# Patient Record
Sex: MALE | Race: WHITE | Employment: OTHER | URBAN - METROPOLITAN AREA
[De-identification: names, ages, dates, MRNs, and addresses within clinical notes are randomized per-mention and may not be internally consistent; named-entity substitution may affect disease eponyms.]

---

## 2017-10-14 ENCOUNTER — HOSPITAL ENCOUNTER (OUTPATIENT)
Dept: MRI IMAGING | Age: 67
Discharge: HOME | End: 2017-10-14
Attending: ORTHOPAEDIC SURGERY
Payer: MEDICARE

## 2017-10-14 DIAGNOSIS — M25.562 LEFT KNEE PAIN: ICD-10-CM

## 2017-10-14 PROCEDURE — 73721 MRI JNT OF LWR EXTRE W/O DYE: CPT

## 2017-11-16 ENCOUNTER — ANESTHESIA EVENT (OUTPATIENT)
Dept: SURGERY | Age: 67
End: 2017-11-16
Payer: MEDICARE

## 2017-11-16 RX ORDER — ATORVASTATIN CALCIUM 20 MG/1
20 TABLET, FILM COATED ORAL DAILY
COMMUNITY
End: 2021-09-10

## 2017-11-16 RX ORDER — MELOXICAM 15 MG/1
15 TABLET ORAL DAILY
COMMUNITY
End: 2021-09-10

## 2017-11-16 RX ORDER — ALBUTEROL SULFATE 90 UG/1
1 AEROSOL, METERED RESPIRATORY (INHALATION)
COMMUNITY
End: 2019-04-08 | Stop reason: ALTCHOICE

## 2017-11-16 RX ORDER — DEXTROMETHORPHAN HYDROBROMIDE, GUAIFENESIN 5; 100 MG/5ML; MG/5ML
650 LIQUID ORAL EVERY 8 HOURS
COMMUNITY
End: 2019-04-08 | Stop reason: ALTCHOICE

## 2017-11-16 RX ORDER — ASPIRIN 81 MG/1
162 TABLET ORAL DAILY
COMMUNITY
End: 2021-09-10

## 2017-11-16 NOTE — PERIOP NOTES
[facility]  Ambulatory Surgery Unit  Pre-operative Instructions    Surgery/Procedure Date  11/17/17            Tentative Arrival Time 0386      1. On the day of your surgery/procedure, please report to the Ambulatory Surgery Unit Registration Desk and sign in at your designated time. The Ambulatory Surgery Unit is located in [facility] on the [facility] side of the [facility] [place] across from the [address] [place] [address]. Please have all of your health insurance cards and a photo ID. 2. You must have someone with you to drive you home, as you should not drive a car for 24 hours following anesthesia. Please make arrangements for a responsible adult friend or family member to stay with you for at least the first 24 hours after your surgery. 3. Do not have anything to eat or drink (including water, gum, mints, coffee, juice) after midnight   11/16/17. This may not apply to medications prescribed by your physician. (Please note below the special instructions with medications to take the morning of surgery, if applicable.)    4. We recommend you do not drink any alcoholic beverages for 24 hours before and after your surgery. 5. Contact your surgeons office for instructions on the following medications: non-steroidal anti-inflammatory drugs (i.e. Advil, Aleve), vitamins, and supplements. (Some surgeons will want you to stop these medications prior to surgery and others may allow you to take them)   **If you are currently taking Plavix, Coumadin, Aspirin and/or other blood-thinning agents, contact your surgeon for instructions. ** Your surgeon will partner with the physician prescribing these medications to determine if it is safe to stop or if you need to continue taking. Please do not stop taking these medications without instructions from your surgeon.     6. In an effort to help prevent surgical site infection, we ask that you shower with an anti-bacterial soap (i.e. Dial or Safeguard) for 3 days prior to and on the morning of surgery, using a fresh towel after each shower. (Please begin this process with fresh bed linens.) Do not apply any lotions, powders, or deodorants after the shower on the day of your procedure. If applicable, please do not shave the operative site for 48 hours prior to surgery. 7. Wear comfortable clothes. Wear glasses instead of contacts. Do not bring any jewelry or money (other than copays or fees as instructed). Do not wear make-up, particularly mascara, the morning of your surgery. Do not wear nail polish, particularly if you are having foot /hand surgery. Wear your hair loose or down, no ponytails, buns, stan pins or clips. All body piercings must be removed. 8. You should understand that if you do not follow these instructions your surgery may be cancelled. If your physical condition changes (i.e. fever, cold or flu) please contact your surgeon as soon as possible. 9. It is important that you be on time. If a situation occurs where you may be late, or if you have any questions or problems, please call [phone].    10. Your surgery time may be subject to change. You will receive a phone call the day prior to surgery to confirm your arrival time. 11. Pediatric patients: please bring a change of clothes, diapers, bottle/sippy cup, pacifier, etc.      Special Instructions: Take all medications and inhalers, as prescribed, on the morning of surgery with a sip of water EXCEPT: none      I understand a pre-operative phone call will be made to verify my surgery time. In the event that I am not available, I give permission for a message to be left on my answering service and/or with another person?       Yes     (instructions given verbally during phone assessment- pt voiced understanding)     ___________________      ___________________      ________________  (Signature of Patient)          (Witness)                   (Date and Time)

## 2017-11-17 ENCOUNTER — ANESTHESIA (OUTPATIENT)
Dept: SURGERY | Age: 67
End: 2017-11-17
Payer: MEDICARE

## 2017-11-17 ENCOUNTER — HOSPITAL ENCOUNTER (OUTPATIENT)
Age: 67
Setting detail: OUTPATIENT SURGERY
Discharge: HOME | End: 2017-11-17
Attending: ORTHOPAEDIC SURGERY | Admitting: ORTHOPAEDIC SURGERY
Payer: MEDICARE

## 2017-11-17 VITALS
WEIGHT: 292 LBS | RESPIRATION RATE: 16 BRPM | HEIGHT: 71 IN | HEART RATE: 88 BPM | DIASTOLIC BLOOD PRESSURE: 77 MMHG | BODY MASS INDEX: 40.88 KG/M2 | OXYGEN SATURATION: 95 % | TEMPERATURE: 97.8 F | SYSTOLIC BLOOD PRESSURE: 125 MMHG

## 2017-11-17 PROCEDURE — 77030011992 HC AIRWY NASOPHGL TELE -A: Performed by: NURSE ANESTHETIST, CERTIFIED REGISTERED

## 2017-11-17 PROCEDURE — 74011000250 HC RX REV CODE- 250

## 2017-11-17 PROCEDURE — 76060000061 HC AMB SURG ANES 0.5 TO 1 HR: Performed by: ORTHOPAEDIC SURGERY

## 2017-11-17 PROCEDURE — 76030000000 HC AMB SURG OR TIME 0.5 TO 1: Performed by: ORTHOPAEDIC SURGERY

## 2017-11-17 PROCEDURE — 77030006884 HC BLD SHV INCIS S&N -B: Performed by: ORTHOPAEDIC SURGERY

## 2017-11-17 PROCEDURE — 77030000032 HC CUF TRNQT ZIMM -B: Performed by: ORTHOPAEDIC SURGERY

## 2017-11-17 PROCEDURE — 76210000046 HC AMBSU PH II REC FIRST 0.5 HR: Performed by: ORTHOPAEDIC SURGERY

## 2017-11-17 PROCEDURE — 74011250636 HC RX REV CODE- 250/636: Performed by: ORTHOPAEDIC SURGERY

## 2017-11-17 PROCEDURE — 77030013079 HC BLNKT BAIR HGGR 3M -A: Performed by: NURSE ANESTHETIST, CERTIFIED REGISTERED

## 2017-11-17 PROCEDURE — 77030008495 HC TBNG ARTHSC IRR CNMD -B: Performed by: ORTHOPAEDIC SURGERY

## 2017-11-17 PROCEDURE — 77030008684 HC TU ET CUF COVD -B: Performed by: NURSE ANESTHETIST, CERTIFIED REGISTERED

## 2017-11-17 PROCEDURE — 74011250636 HC RX REV CODE- 250/636

## 2017-11-17 PROCEDURE — 77030018835 HC SOL IRR LR ICUM -A: Performed by: ORTHOPAEDIC SURGERY

## 2017-11-17 PROCEDURE — 77030002916 HC SUT ETHLN J&J -A: Performed by: ORTHOPAEDIC SURGERY

## 2017-11-17 PROCEDURE — 74011250636 HC RX REV CODE- 250/636: Performed by: ANESTHESIOLOGY

## 2017-11-17 PROCEDURE — 76210000035 HC AMBSU PH I REC 1 TO 1.5 HR: Performed by: ORTHOPAEDIC SURGERY

## 2017-11-17 PROCEDURE — 77030026438 HC STYL ET INTUB CARD -A: Performed by: NURSE ANESTHETIST, CERTIFIED REGISTERED

## 2017-11-17 PROCEDURE — 74011250637 HC RX REV CODE- 250/637: Performed by: ORTHOPAEDIC SURGERY

## 2017-11-17 PROCEDURE — 77030020255 HC SOL INJ LR 1000ML BG: Performed by: ORTHOPAEDIC SURGERY

## 2017-11-17 RX ORDER — HYDROCODONE BITARTRATE AND ACETAMINOPHEN 5; 325 MG/1; MG/1
1 TABLET ORAL AS NEEDED
Status: DISCONTINUED | OUTPATIENT
Start: 2017-11-17 | End: 2017-11-17 | Stop reason: HOSPADM

## 2017-11-17 RX ORDER — SUCCINYLCHOLINE CHLORIDE 20 MG/ML
INJECTION INTRAMUSCULAR; INTRAVENOUS AS NEEDED
Status: DISCONTINUED | OUTPATIENT
Start: 2017-11-17 | End: 2017-11-17 | Stop reason: HOSPADM

## 2017-11-17 RX ORDER — MIDAZOLAM HYDROCHLORIDE 1 MG/ML
INJECTION, SOLUTION INTRAMUSCULAR; INTRAVENOUS AS NEEDED
Status: DISCONTINUED | OUTPATIENT
Start: 2017-11-17 | End: 2017-11-17 | Stop reason: HOSPADM

## 2017-11-17 RX ORDER — FENTANYL CITRATE 50 UG/ML
INJECTION, SOLUTION INTRAMUSCULAR; INTRAVENOUS AS NEEDED
Status: DISCONTINUED | OUTPATIENT
Start: 2017-11-17 | End: 2017-11-17 | Stop reason: HOSPADM

## 2017-11-17 RX ORDER — SODIUM CHLORIDE 0.9 % (FLUSH) 0.9 %
5-10 SYRINGE (ML) INJECTION AS NEEDED
Status: DISCONTINUED | OUTPATIENT
Start: 2017-11-17 | End: 2017-11-17 | Stop reason: HOSPADM

## 2017-11-17 RX ORDER — SODIUM CHLORIDE 0.9 % (FLUSH) 0.9 %
5-10 SYRINGE (ML) INJECTION EVERY 8 HOURS
Status: DISCONTINUED | OUTPATIENT
Start: 2017-11-17 | End: 2017-11-17 | Stop reason: HOSPADM

## 2017-11-17 RX ORDER — LIDOCAINE HYDROCHLORIDE 10 MG/ML
0.1 INJECTION, SOLUTION EPIDURAL; INFILTRATION; INTRACAUDAL; PERINEURAL AS NEEDED
Status: DISCONTINUED | OUTPATIENT
Start: 2017-11-17 | End: 2017-11-17 | Stop reason: HOSPADM

## 2017-11-17 RX ORDER — PROPOFOL 10 MG/ML
INJECTION, EMULSION INTRAVENOUS AS NEEDED
Status: DISCONTINUED | OUTPATIENT
Start: 2017-11-17 | End: 2017-11-17 | Stop reason: HOSPADM

## 2017-11-17 RX ORDER — KETOROLAC TROMETHAMINE 30 MG/ML
INJECTION, SOLUTION INTRAMUSCULAR; INTRAVENOUS AS NEEDED
Status: DISCONTINUED | OUTPATIENT
Start: 2017-11-17 | End: 2017-11-17 | Stop reason: HOSPADM

## 2017-11-17 RX ORDER — LIDOCAINE HYDROCHLORIDE 20 MG/ML
INJECTION, SOLUTION EPIDURAL; INFILTRATION; INTRACAUDAL; PERINEURAL AS NEEDED
Status: DISCONTINUED | OUTPATIENT
Start: 2017-11-17 | End: 2017-11-17 | Stop reason: HOSPADM

## 2017-11-17 RX ORDER — ROPIVACAINE HYDROCHLORIDE 5 MG/ML
INJECTION, SOLUTION EPIDURAL; INFILTRATION; PERINEURAL AS NEEDED
Status: DISCONTINUED | OUTPATIENT
Start: 2017-11-17 | End: 2017-11-17 | Stop reason: HOSPADM

## 2017-11-17 RX ORDER — DEXAMETHASONE SODIUM PHOSPHATE 4 MG/ML
INJECTION, SOLUTION INTRA-ARTICULAR; INTRALESIONAL; INTRAMUSCULAR; INTRAVENOUS; SOFT TISSUE AS NEEDED
Status: DISCONTINUED | OUTPATIENT
Start: 2017-11-17 | End: 2017-11-17 | Stop reason: HOSPADM

## 2017-11-17 RX ORDER — SODIUM CHLORIDE, SODIUM LACTATE, POTASSIUM CHLORIDE, CALCIUM CHLORIDE 600; 310; 30; 20 MG/100ML; MG/100ML; MG/100ML; MG/100ML
25 INJECTION, SOLUTION INTRAVENOUS CONTINUOUS
Status: DISCONTINUED | OUTPATIENT
Start: 2017-11-17 | End: 2017-11-17 | Stop reason: HOSPADM

## 2017-11-17 RX ORDER — ONDANSETRON 2 MG/ML
INJECTION INTRAMUSCULAR; INTRAVENOUS AS NEEDED
Status: DISCONTINUED | OUTPATIENT
Start: 2017-11-17 | End: 2017-11-17 | Stop reason: HOSPADM

## 2017-11-17 RX ORDER — ROCURONIUM BROMIDE 10 MG/ML
INJECTION, SOLUTION INTRAVENOUS AS NEEDED
Status: DISCONTINUED | OUTPATIENT
Start: 2017-11-17 | End: 2017-11-17 | Stop reason: HOSPADM

## 2017-11-17 RX ORDER — MORPHINE SULFATE 10 MG/ML
2 INJECTION, SOLUTION INTRAMUSCULAR; INTRAVENOUS
Status: DISCONTINUED | OUTPATIENT
Start: 2017-11-17 | End: 2017-11-17 | Stop reason: HOSPADM

## 2017-11-17 RX ORDER — CEPHALEXIN 500 MG/1
500 CAPSULE ORAL 3 TIMES DAILY
Qty: 9 CAP | Refills: 0 | Status: SHIPPED | OUTPATIENT
Start: 2017-11-17 | End: 2017-11-20

## 2017-11-17 RX ORDER — DIPHENHYDRAMINE HYDROCHLORIDE 50 MG/ML
12.5 INJECTION, SOLUTION INTRAMUSCULAR; INTRAVENOUS AS NEEDED
Status: DISCONTINUED | OUTPATIENT
Start: 2017-11-17 | End: 2017-11-17 | Stop reason: HOSPADM

## 2017-11-17 RX ORDER — PHENYLEPHRINE HCL IN 0.9% NACL 0.4MG/10ML
SYRINGE (ML) INTRAVENOUS AS NEEDED
Status: DISCONTINUED | OUTPATIENT
Start: 2017-11-17 | End: 2017-11-17 | Stop reason: HOSPADM

## 2017-11-17 RX ORDER — ACETAMINOPHEN 10 MG/ML
INJECTION, SOLUTION INTRAVENOUS AS NEEDED
Status: DISCONTINUED | OUTPATIENT
Start: 2017-11-17 | End: 2017-11-17 | Stop reason: HOSPADM

## 2017-11-17 RX ORDER — HYDROCODONE BITARTRATE AND ACETAMINOPHEN 5; 325 MG/1; MG/1
1-2 TABLET ORAL
Qty: 40 TAB | Refills: 0 | Status: SHIPPED | OUTPATIENT
Start: 2017-11-17 | End: 2019-04-08 | Stop reason: ALTCHOICE

## 2017-11-17 RX ORDER — GLYCOPYRROLATE 0.2 MG/ML
INJECTION INTRAMUSCULAR; INTRAVENOUS AS NEEDED
Status: DISCONTINUED | OUTPATIENT
Start: 2017-11-17 | End: 2017-11-17 | Stop reason: HOSPADM

## 2017-11-17 RX ORDER — HYDROMORPHONE HYDROCHLORIDE 1 MG/ML
.2-.5 INJECTION, SOLUTION INTRAMUSCULAR; INTRAVENOUS; SUBCUTANEOUS ONCE
Status: DISCONTINUED | OUTPATIENT
Start: 2017-11-17 | End: 2017-11-17 | Stop reason: HOSPADM

## 2017-11-17 RX ORDER — FENTANYL CITRATE 50 UG/ML
25 INJECTION, SOLUTION INTRAMUSCULAR; INTRAVENOUS
Status: DISCONTINUED | OUTPATIENT
Start: 2017-11-17 | End: 2017-11-17 | Stop reason: HOSPADM

## 2017-11-17 RX ADMIN — RIVAROXABAN 10 MG: 10 TABLET, FILM COATED ORAL at 14:27

## 2017-11-17 RX ADMIN — ACETAMINOPHEN 1000 MG: 10 INJECTION, SOLUTION INTRAVENOUS at 13:11

## 2017-11-17 RX ADMIN — CEFAZOLIN 3 G: 1 INJECTION, POWDER, FOR SOLUTION INTRAMUSCULAR; INTRAVENOUS; PARENTERAL at 12:57

## 2017-11-17 RX ADMIN — SUCCINYLCHOLINE CHLORIDE 200 MG: 20 INJECTION INTRAMUSCULAR; INTRAVENOUS at 12:53

## 2017-11-17 RX ADMIN — DEXAMETHASONE SODIUM PHOSPHATE 8 MG: 4 INJECTION, SOLUTION INTRA-ARTICULAR; INTRALESIONAL; INTRAMUSCULAR; INTRAVENOUS; SOFT TISSUE at 13:00

## 2017-11-17 RX ADMIN — ROCURONIUM BROMIDE 5 MG: 10 INJECTION, SOLUTION INTRAVENOUS at 12:53

## 2017-11-17 RX ADMIN — KETOROLAC TROMETHAMINE 30 MG: 30 INJECTION, SOLUTION INTRAMUSCULAR; INTRAVENOUS at 13:26

## 2017-11-17 RX ADMIN — Medication 40 MCG: at 13:27

## 2017-11-17 RX ADMIN — ONDANSETRON 4 MG: 2 INJECTION INTRAMUSCULAR; INTRAVENOUS at 13:21

## 2017-11-17 RX ADMIN — FENTANYL CITRATE 25 MCG: 50 INJECTION, SOLUTION INTRAMUSCULAR; INTRAVENOUS at 13:11

## 2017-11-17 RX ADMIN — MIDAZOLAM HYDROCHLORIDE 1 MG: 1 INJECTION, SOLUTION INTRAMUSCULAR; INTRAVENOUS at 12:53

## 2017-11-17 RX ADMIN — LIDOCAINE HYDROCHLORIDE 100 MG: 20 INJECTION, SOLUTION EPIDURAL; INFILTRATION; INTRACAUDAL; PERINEURAL at 12:53

## 2017-11-17 RX ADMIN — FENTANYL CITRATE 25 MCG: 50 INJECTION, SOLUTION INTRAMUSCULAR; INTRAVENOUS at 12:53

## 2017-11-17 RX ADMIN — GLYCOPYRROLATE 0.1 MG: 0.2 INJECTION INTRAMUSCULAR; INTRAVENOUS at 13:25

## 2017-11-17 RX ADMIN — Medication 40 MCG: at 13:05

## 2017-11-17 RX ADMIN — SODIUM CHLORIDE, SODIUM LACTATE, POTASSIUM CHLORIDE, AND CALCIUM CHLORIDE 25 ML/HR: 600; 310; 30; 20 INJECTION, SOLUTION INTRAVENOUS at 11:54

## 2017-11-17 RX ADMIN — MIDAZOLAM HYDROCHLORIDE 1 MG: 1 INJECTION, SOLUTION INTRAMUSCULAR; INTRAVENOUS at 12:49

## 2017-11-17 RX ADMIN — FENTANYL CITRATE 25 MCG: 50 INJECTION, SOLUTION INTRAMUSCULAR; INTRAVENOUS at 13:01

## 2017-11-17 RX ADMIN — PROPOFOL 200 MG: 10 INJECTION, EMULSION INTRAVENOUS at 12:53

## 2017-11-17 NOTE — PERIOP NOTES
PACU~  1348-Pt received to pacu. Pt remains sedated, not arousable, has nasal trumphet in (L). On nasal cannula & open face tent at 100%. HOB elevated for easier breathing. Lt knee dressing dry, strong pedal pulses; elevated & applied ice.  1359- Pt arousing to stimuli. Pt able to cough, non-productive. 1407-Pt opening eyes briefly. 1409-Nasal airway removed. Pt arouses and denies complaints. Pt spont coughs, encouraged deep breathing. Also encouraging ankle pumps. Discharge instructions reviewed it conference room with wife. 1420-Pt waking more, face tent off. SaO2 92-94% on nasal cannula. Pt sipping soda. 1427-Xarelto admin per orders. Incentive spirometer used, pt has used before. Instructions given. Pt tolerated well, x5 upto 2940-7551. Incentive spirometer brought SaO2 up to 97-98%. After a few minutes SaO2 does go down to 88-90%. SaO2 does spont go back up without instruction or encouragement. 1435-Dr Karen Jeff at bedside. 1443-Pt awake and alert, eating crackers and drinking soda. Wife at bedside. SaO2 90-97% on room air. 1500-Pt cont to work on coughing and deep breathing. Pt is going for a sleep study next week. Dr Viri Bingham at bedside. Gave script for walker. Wife got scripts filled. 1505-Pt meets discharge criteria. He is more awake and denies complaints; pt maintaining SaO2 90-95%. His SaO2 does dip in to the 80s while he is eating or with activity. Instructed to eat slower & take deep breaths in between bites. Pt is taking his incentive spirometer home and was instructed to use every hour today while awake, wife also is instructed to encourage pt  to cough and deep breath every hour as well as perform ankle pumps. Pt states he is ready to go home & wife agrees. PIV removed & assisted pt with dressing. 1520-Pt discharged home in stable condition via w/c to car. Stand/pivot is steady is with minimal assist/support.

## 2017-11-17 NOTE — IP AVS SNAPSHOT
[address] [facility] 
[phone] Patient: Eleanor Abdalla MRN: [id number] UPQ:[id number]/[id number] About your hospitalization You were admitted on:  November 17, 2017 You last received care in the:  [facility] SURGERY UNIT You were discharged on:  November 17, 2017 Why you were hospitalized Your primary diagnosis was:  Not on File Things You Need To Do (next 8 weeks) Schedule an appointment with Maggie Mario MD as soon as possible for a visit in 1 week(s) Phone:  [phone] Where:  [facility] Discharge Orders None A check naty indicates which time of day the medication should be taken. My Medications TAKE these medications as instructed Instructions Each Dose to Equal  
 Morning Noon Evening Bedtime  
 albuterol 90 mcg/actuation inhaler Commonly known as:  PROVENTIL HFA, VENTOLIN HFA, PROAIR HFA Your last dose was: Your next dose is: Take 1 Puff by inhalation every four (4) hours as needed for Wheezing. 1 Puff  
    
   
   
   
  
 aspirin delayed-release 81 mg tablet Your last dose was: Your next dose is: Take 162 mg by mouth daily. 162 mg  
    
   
   
   
  
 cephALEXin 500 mg capsule Commonly known as:  Hilda Rummer Your last dose was: Your next dose is: Take 1 Cap by mouth three (3) times daily for 3 days. 500 mg HYDROcodone-acetaminophen 5-325 mg per tablet Commonly known as:  [address] Your last dose was: Your next dose is: Take 1-2 Tabs by mouth every four (4) hours as needed for Pain. Max Daily Amount: 12 Tabs. 1-2 Tab  
    
   
   
   
  
 LIPITOR 20 mg tablet Generic drug:  atorvastatin Your last dose was: Your next dose is: Take 20 mg by mouth daily.   
 20 mg  
    
   
   
   
  
 meloxicam 15 mg tablet Commonly known as:  MOBIC Your last dose was: Your next dose is: Take 15 mg by mouth daily. 15 mg PROTONIX 40 mg tablet Generic drug:  pantoprazole Your last dose was: Your next dose is: Take 40 mg by mouth daily. 40 mg  
    
   
   
   
  
 raNITIdine 300 mg tablet Commonly known as:  ZANTAC Your last dose was: Your next dose is: Take 300 mg by mouth daily. 300 mg  
    
   
   
   
  
 TYLENOL ARTHRITIS PAIN 650 mg Tber Generic drug:  acetaminophen Your last dose was: Your next dose is: Take 650 mg by mouth every eight (8) hours. 650 mg  
    
   
   
   
  
 VITAMIN D3 PO Your last dose was: Your next dose is: Take  by mouth daily. Where to Get Your Medications Information on where to get these meds will be given to you by the nurse or doctor. ! Ask your nurse or doctor about these medications  
  cephALEXin 500 mg capsule HYDROcodone-acetaminophen 5-325 mg per tablet Discharge Instructions [facility] 
POST OPERATIVE ARTHROSCOPY INSTRUCTIONS 1. Keep the operated extremity elevated above heart level as much as possible for at least 48 hours after surgery. 2. Keep a double wrapped bag of ice directly over the area of your surgery for 24 to 48 hours after surgery. Use a towel if necessary to prevent the ice bag from directly contacting your skin and alternate ice on and off the area every 30 minutes. You may notice a small amount of bloody drainage on the bandage which is normal. 
3. Do not allow your bandage to get wet. If it does, change it immediately replacing it with a clean, dry, sterile dressing. 4. Do not wrap ace bandages or other dressings too tight.  
5. One day following surgery you may remove the dressings and place band-aids over each wound afterwards. 6. Unless your doctor gives you instructions otherwise, you may put as much weight on your operated leg as is comfortable but minimize the amount of time that you are on your feet to minimize swelling. 7. Use the crutches as necessary to assist you in walking, but it is not necessary if you are comfortable without them. 8. Take Tylenol or prescription medicines as necessary to control your pain. Ice and elevation will help as well. 9. On the first day after surgery you should begin quadriceps strengthening exercises by maximally tightening the muscles in the front of your thigh and holding it to a count of ten, then relaxing. Repeat this 30 times, twice daily. You may also raise your leg a few inches off the ground to a count of 10 and repeat this exercise 30 times, twice daily. You cannot injure yourself by dong these simple exercises. 10. TAKE ONE ASPIRIN (NOT TYLENOL OR IBUPROFEN) DAILY FOR 4 WEEKS TO HELP PREVENT BLOOD CLOTS IN YOUR LEG. NOTIFY DR. WEBB IF FOR SOME REASON YOU CANNOT TAKE ASPIRIN. 11. You may begin showering 3 days after surgery and apply dry band-aids afterwards. 12. Make follow-up appointment for approximately 7 days after surgery to have sutures removed. Call [phone] and ask for Karla or Pily Horseman 
 
>>>You received an IV form of Tylenol 1000mg during your surgery, you may take tylenol (or pain medication containing Tylenol or Acetaminophen) in 6 hours at 7:00PM.<<< 
 
 
>>>You received Toradol during your surgery. You may not take any form of NSAIDS (non steroidal anti inflammatory drugs) such as Advil, Ibuprofen, Aleve, Motrin until 7:30PM.<<< 
 
 
DO NOT TAKE TYLENOL/ACETAMINOPHEN WITH  NORCO. TAKE NARCOTIC PAIN MEDICATIONS WITH FOOD Narcotics tend to be constipating, we suggest taking a stool softener such as Colace or Miralax (follow package instructions). DO NOT DRIVE WHILE TAKING NARCOTIC PAIN MEDICATIONS. DO NOT TAKE SLEEPING MEDICATIONS OR ANTIANXIETY MEDICATIONS WHILE TAKING NARCOTIC PAIN MEDICATIONS,  ESPECIALLY THE NIGHT OF ANESTHESIA. CPAP PATIENTS BE SURE TO WEAR MACHINE WHENEVER NAPPING OR SLEEPING. DISCHARGE SUMMARY from Nurse The following personal items collected during your admission are returned to you:  
Dental Appliance: Dental Appliances: With patient, Partials Vision: Visual Aid: Glasses (given to wife) Hearing Aid:   
Jewelry: Jewelry: None Clothing: Clothing: Other (comment) (belonging bag) Other Valuables: Other Valuables: None Valuables sent to safe:   
 
 
PATIENT INSTRUCTIONS: 
 
 
B - Balance E - Eyes F-  Face looks uneven A-  Arms unable to move or move even S-  Speech slurred or non-existent T-  Time-call 911 as soon as signs and symptoms begin-DO NOT go Back to bed or wait to see if you get better-TIME IS BRAIN. If you have not received your influenza and/or pneumococcal vaccine, please follow up with your primary care physician. The discharge information has been reviewed with the patient and caregiver. The patient and caregiver verbalized understanding. Introducing [facility]! [employer] introduces Linkovery patient portal. Now you can access parts of your medical record, email your doctor's office, and request medication refills online. 1. In your internet browser, go to [email or web address] Inct 2. Click on the First Time User? Click Here link in the Sign In box. You will see the New Member Sign Up page. 3. Enter your Linkovery Access Code exactly as it appears below. You will not need to use this code after youve completed the sign-up process. If you do not sign up before the expiration date, you must request a new code. · Linkovery Access Code: [email or web address] Expires: 1/3/2018  2:38 PM 
 
4. Enter the last four digits of your Social Security Number (xxxx) and Date of Birth (mm/dd/yyyy) as indicated and click Submit. You will be taken to the next sign-up page. 5. Create a Linkovery ID. This will be your Linkovery login ID and cannot be changed, so think of one that is secure and easy to remember. 6. Create a Linkovery password. You can change your password at any time. 7. Enter your Password Reset Question and Answer. This can be used at a later time if you forget your password. 8. Enter your e-mail address.  You will receive e-mail notification when new information is available in [employer]eview. 9. Click Sign Up. You can now view and download portions of your medical record. 10. Click the Download Summary menu link to download a portable copy of your medical information. If you have questions, please visit the Frequently Asked Questions section of the [employer]iew website. Remember, [employer] is NOT to be used for urgent needs. For medical emergencies, dial 911. Now available from your iPhone and Android! Providers Seen During Your Hospitalization Provider Specialty Primary office phone Jose Luis Renner MD Orthopedic Surgery [phone] Your Primary Care Physician (PCP) Primary Care Physician Office Phone Office Fax Arterry Sandra [phone] You are allergic to the following Allergen Reactions Crestor (Rosuvastatin) Shortness of Breath Percocet (Oxycodone-Acetaminophen) Rash Recent Documentation Height Weight BMI Smoking Status 1.791 m 132.5 kg 41.31 kg/m2 Former Smoker Emergency Contacts Name Discharge Info Relation Home Work Mobile [address] CAREGIVER [3] Spouse [3] [phone] Patient Belongings The following personal items are in your possession at time of discharge: 
  Dental Appliances: With patient, Partials  Visual Aid: Glasses (given to wife)      Home Medications: None   Jewelry: None  Clothing: Other (comment) (belonging bag)    Other Valuables: None Discharge Instructions Attachments/References MEFS - CEPHALEXIN (BIO-CEF, KEFLEX) - (BY MOUTH) (ENGLISH) MEFS - HYDROCODONE/ACETAMINOPHEN (VICODIN, Candice Oyster, LORTAB) - (BY MOUTH) (ENGLISH) Patient Handouts Cephalexin (Bio-Cef, Keflex) - (By mouth) Why this medicine is used:  
Treats infections. Contact a nurse or doctor right away if you have: · Blistering, peeling, or red skin rash · Severe or bloody diarrhea Common side effects: · Mild diarrhea or nausea © 2017 [address] Information is for End User's use only and may not be sold, redistributed or otherwise used for commercial purposes. Hydrocodone/Acetaminophen (Vicodin, Norco, Lortab) - (By mouth) Why this medicine is used:  
Treats pain. Contact a nurse or doctor right away if you have: · Blistering, peeling, red skin rash · Fast or slow heartbeat, shallow breathing, blue lips, fingernails, or skin · Anxiety, restlessness, muscle spasms, twitching, seeing or hearing things that are not there · Dark urine or pale stools, yellow skin or eyes · Extreme weakness, sweating, seizures, cold or clammy skin · Lightheadedness, dizziness, fainting, fever, sweating Common side effects: 
· Constipation, nausea, vomiting, loss of appetite, stomach pain · Tiredness or sleepiness © 2017 [address]  Information is for End User's use only and may not be sold, redistributed or otherwise used for commercial purposes. Please provide this summary of care documentation to your next provider. Signatures-by signing, you are acknowledging that this After Visit Summary has been reviewed with you and you have received a copy. Patient Signature:  ____________________________________________________________ Date:  ____________________________________________________________  
  
[facility] Provider Signature:  ____________________________________________________________ Date:  ____________________________________________________________

## 2017-11-17 NOTE — ANESTHESIA POSTPROCEDURE EVALUATION
Post-Anesthesia Evaluation and Assessment    Patient: Sadie Mccann MRN: [id number]  SSN: [id number]    YOB: 1950  Age: 69 y.o. Sex: male       Cardiovascular Function/Vital Signs  Visit Vitals    /77    Pulse 88    Temp 36.6 °C (97.8 °F)    Resp 16    Ht 5' 10.5\" (1.791 m)    Wt 132.5 kg (292 lb)    SpO2 95%    BMI 41.31 kg/m2       Patient is status post general anesthesia for Procedure(s):  ARTHROSCOPIC DEBRIDEMENT LEFT KNEE. Nausea/Vomiting: None    Postoperative hydration reviewed and adequate. Pain:  Pain Scale 1: Numeric (0 - 10) (11/17/17 1501)  Pain Intensity 1: 0 (11/17/17 1501)   Managed    Neurological Status:   Neuro (WDL): Within Defined Limits (11/17/17 1501)  Neuro  Neurologic State: Alert (11/17/17 1501)   At baseline    Mental Status and Level of Consciousness: Arousable    Pulmonary Status:   O2 Device: Room air (11/17/17 1501)   Adequate oxygenation and airway patent    Complications related to anesthesia: None    Post-anesthesia assessment completed. Pt initially having problems maintaining his O2 sats on room air; has sleep apnea, & is morbidly obese. Required coaching to deep breathe & cough. Using IS. To go for sleep study next week.     Signed By: Marita Arroyo MD     November 17, 2017

## 2017-11-17 NOTE — PERIOP NOTES
Marino Mcintosh  1950  [id number]    Situation:  Verbal report given from: VAMSHI Ng CRNA  Procedure: Procedure(s):  ARTHROSCOPIC DEBRIDEMENT LEFT KNEE    Background:    Preoperative diagnosis: LEFT KNEE MENISCUS TEAR    Postoperative diagnosis: LEFT KNEE MENISCUS TEAR    :  Dr. Tarik Donis    Assistant(s): Circ-1: Christel Lopez  Scrub Tech-1: Willian Varner    Specimens: * No specimens in log *    Assessment:  Intra-procedure medications         Anesthesia gave intra-procedure sedation and medications, see anesthesia flow sheet     Intravenous fluids: LR@ KVO     Vital signs stable   \"slow to wake\", difficult airway, NIKHIL & doesn't use CPAP      Recommendation:    Permission to share finding with family or friend yes

## 2017-11-17 NOTE — BRIEF OP NOTE
BRIEF OPERATIVE NOTE    Date of Procedure: 11/17/2017   Preoperative Diagnosis: LEFT KNEE MENISCUS TEAR  Postoperative Diagnosis: LEFT KNEE MENISCUS TEAR    Procedure(s):  ARTHROSCOPIC DEBRIDEMENT LEFT KNEE  Surgeon(s) and Role:     * Demario Sands MD - Primary         Assistant Staff:       Surgical Staff:  Circ-1: Kareen Simons  Scrub Tech-1: Angelica Varner  No case tracking events are documented in the log.   Anesthesia: General   Estimated Blood Loss: 0  Specimens: * No specimens in log *   Findings: 0   Complications: 0  Implants: * No implants in log *

## 2017-11-17 NOTE — ANESTHESIA PREPROCEDURE EVALUATION
Anesthetic History   No history of anesthetic complications            Review of Systems / Medical History  Patient summary reviewed, nursing notes reviewed and pertinent labs reviewed    Pulmonary        Sleep apnea: No treatment  Shortness of breath ( MULLIGAN; stable.  RA sats 95%)         Neuro/Psych   Within defined limits           Cardiovascular              Past MI (2014) and CAD (s/p stent 2014)      Comments: 08/17 Stress Test= EF 63%, normal   GI/Hepatic/Renal     GERD: well controlled           Endo/Other        Morbid obesity and cancer (prostate)     Other Findings            Physical Exam    Airway  Mallampati: III    Neck ROM: normal range of motion, short neck   Mouth opening: Normal    Comments: Large face Cardiovascular    Rhythm: regular  Rate: normal      Pertinent negatives: No murmur   Dental    Dentition: Upper partial plate     Pulmonary  Breath sounds clear to auscultation               Abdominal  GI exam deferred       Other Findings            Anesthetic Plan    ASA: 3  Anesthesia type: general    Monitoring Plan: BIS      Induction: Intravenous  Anesthetic plan and risks discussed with: Patient

## 2017-11-17 NOTE — DISCHARGE INSTRUCTIONS
[place] [facility]  POST OPERATIVE ARTHROSCOPY INSTRUCTIONS    1. Keep the operated extremity elevated above heart level as much as possible for at least 48 hours after surgery. 2. Keep a double wrapped bag of ice directly over the area of your surgery for 24 to 48 hours after surgery. Use a towel if necessary to prevent the ice bag from directly contacting your skin and alternate ice on and off the area every 30 minutes. You may notice a small amount of bloody drainage on the bandage which is normal.  3. Do not allow your bandage to get wet. If it does, change it immediately replacing it with a clean, dry, sterile dressing. 4. Do not wrap ace bandages or other dressings too tight. 5. One day following surgery you may remove the dressings and place band-aids over each wound afterwards. 6. Unless your doctor gives you instructions otherwise, you may put as much weight on your operated leg as is comfortable but minimize the amount of time that you are on your feet to minimize swelling. 7. Use the walker or cane as necessary to assist you in walking, but it is not necessary if you are comfortable without them. 8. Take Tylenol or prescription medicines as necessary to control your pain. Ice and elevation will help as well. 9. On the first day after surgery you should begin quadriceps strengthening exercises by maximally tightening the muscles in the front of your thigh and holding it to a count of ten, then relaxing. Repeat this 30 times, twice daily. You may also raise your leg a few inches off the ground to a count of 10 and repeat this exercise 30 times, twice daily. You cannot injure yourself by dong these simple exercises. 10. TAKE ONE ASPIRIN (NOT TYLENOL OR IBUPROFEN) DAILY FOR 4 WEEKS TO HELP PREVENT BLOOD CLOTS IN YOUR LEG. NOTIFY DR. WEBB IF FOR SOME REASON YOU CANNOT TAKE ASPIRIN. 11. You may begin showering 3 days after surgery and apply dry band-aids afterwards.   12. Make follow-up appointment for approximately 7 days after surgery to have sutures removed. Call [phone] and ask for Karla or Gera Slicker    >>>You received an IV form of Tylenol 1000mg during your surgery, you may take tylenol (or pain medication containing Tylenol or Acetaminophen) in 6 hours at 7:00PM.<<<      >>>You received Toradol during your surgery. You may not take any form of NSAIDS (non steroidal anti inflammatory drugs) such as Advil, Ibuprofen, Aleve, Motrin until 7:30PM.<<<      DO NOT TAKE TYLENOL/ACETAMINOPHEN WITH  NORCO. TAKE NARCOTIC PAIN MEDICATIONS WITH FOOD          Narcotics tend to be constipating, we suggest taking a stool softener such as Colace or Miralax (follow package instructions). DO NOT DRIVE WHILE TAKING NARCOTIC PAIN MEDICATIONS. DO NOT TAKE SLEEPING MEDICATIONS OR ANTIANXIETY MEDICATIONS WHILE TAKING NARCOTIC PAIN MEDICATIONS,  ESPECIALLY THE NIGHT OF ANESTHESIA. CPAP PATIENTS BE SURE TO WEAR MACHINE WHENEVER NAPPING OR SLEEPING. Perform incentive spirometer 10 times every hour while awake. Also cough deeply every hour while awake. DISCHARGE SUMMARY from Nurse    The following personal items collected during your admission are returned to you:   Dental Appliance: Dental Appliances: With patient, Partials returned to pt  Vision: Visual Aid: Glasses (given to wife)  Hearing Aid:    Jewelry: Jewelry: None  Clothing: Clothing: Other (comment) (belonging bag)  Other Valuables: Other Valuables: None  Valuables sent to safe:        PATIENT INSTRUCTIONS:    After General Anesthesia or Intravenous Sedation, for 24 hours or while taking prescription Narcotics:        Someone should be with you for the next 24 hours. For your own safety, a responsible adult must drive you home. · Limit your activities  · Recommended activity: Rest today, up with assistance today. Do not climb stairs or shower unattended for the next 24 hours.   · Please start with a soft bland diet and advance as tolerated (no nausea) to regular diet. · If you have a sore throat you should try the following: fluids, warm salt water gargles, or throat lozenges. If it does not improve after several days please follow up with your primary physician. · Do not drive and operate hazardous machinery  · Do not make important personal or business decisions  · Do  not drink alcoholic beverages  · If you have not urinated within 8 hours after discharge, please contact your surgeon on call. Report the following to your surgeon:  · Excessive pain, swelling, redness or odor of or around the surgical area  · Temperature over 100.5  · Nausea and vomiting lasting longer than 4 hours or if unable to take medications  · Any signs of decreased circulation or nerve impairment to extremity: change in color, persistent  numbness, tingling, coldness or increase pain      · You will receive a Post Operative Call from one of the Recovery Room Nurses on the day after your surgery to check on you. It is very important for us to know how you are recovering after your surgery. If you have an issue or need to speak with someone, please call your surgeon, do not wait for the post operative call. · You may receive an e-mail or letter in the mail from Jamila regarding your experience with us in the Ambulatory Surgery Unit. Your feedback is valuable to us and we appreciate your participation in the survey. · If the above instructions are not adequate, please contact Yoan Gonzalez RN, [facility] anesthesia Nurse Manager or our Anesthesiologist, at [phone]. If you are having problems after your surgery, call the physician at his office number. · We wish you a speedy recovery ? What to do at Home:      *  Please give a list of your current medications to your Primary Care Provider.     *  Please update this list whenever your medications are discontinued, doses are      changed, or new medications (including over-the-counter products) are added. *  Please carry medication information at all times in case of emergency situations. These are general instructions for a healthy lifestyle:    No smoking/ No tobacco products/ Avoid exposure to second hand smoke    Surgeon General's Warning:  Quitting smoking now greatly reduces serious risk to your health. Obesity, smoking, and sedentary lifestyle greatly increases your risk for illness    A healthy diet, regular physical exercise & weight monitoring are important for maintaining a healthy lifestyle    You may be retaining fluid if you have a history of heart failure or if you experience any of the following symptoms:  Weight gain of 3 pounds or more overnight or 5 pounds in a week, increased swelling in our hands or feet or shortness of breath while lying flat in bed. Please call your doctor as soon as you notice any of these symptoms; do not wait until your next office visit. Recognize signs and symptoms of STROKE:    B - Balance  E - Eyes    F-  Face looks uneven    A-  Arms unable to move or move even    S-  Speech slurred or non-existent    T-  Time-call 911 as soon as signs and symptoms begin-DO NOT go       Back to bed or wait to see if you get better-TIME IS BRAIN. If you have not received your influenza and/or pneumococcal vaccine, please follow up with your primary care physician. The discharge information has been reviewed with the patient and caregiver. The patient and caregiver verbalized understanding.

## 2017-11-18 NOTE — OP NOTES
[facility]    [place] [address]   OP NOTE       Name:  Beronica Pizarro   MR#:  [id number]   :  1950   Account #:  [de-identified]    Surgery Date:  2017   Date of Adm:  2017       PREOPERATIVE DIAGNOS IS:  Torn medial meniscus and   patellofemoral chondrosis, left knee. POSTOPERATIVE DIAGNOS IS:  Torn medial meniscus and   patellofemoral chondrosis, left knee. PROCEDURES PERFORMED:  Arthroscopic left medial meniscectomy   and patellofemoral chondroplasty. ESTIMATED BLOOD LOSS:  Minimal.     CLOSURE: Nylon. SPECIMENS REMOVED: **0     ANESTHESIA:  General.     SURGEON: Emily Hooker. Jose Carlos King MD.     CLOSURE: Nylon. DESCRIPTION OF PROCEDURE: The patient was given smooth   induction of general anesthesia in the supine position on the operating   table. The left lower extremity was prepped and draped. A thigh   tourniquet and thigh barahona, arthroscopic portals were established. Examination began in the suprapatellar pouch, which was normal. The   patellofemoral joint revealed grade 2 diffuse chondrosis on both the   trochlear and patellar surfaces, which was smoothed with a sucker   shaver. The medial gutter was normal. The medial compartment   revealed a calcified medial meniscus with complex tearing of the   posterior horn that was debrided with a sucker shaver and basket   forceps back to a smooth, stable rim. There was grade 2 chondrosis on   the medial femoral condyle. The notch was normal. The lateral   compartment revealed calcification of the meniscus but no tearing. The   knee joint was copiously irrigated and the arthroscopic instruments   removed. The portals were held closed with 3-0 nylon sutures and the   knee injected with ropivacaine. Sterile dressings were applied. The   patient was taken to the recovery room in stable, extubated condition   with a correct count and good pulse to his foot.         THALIA BOSTON MD KURTIS Black / Charlotte Caceres   D:  11/17/2017   13:34   T:  11/17/2017   21:43   Job #:  [id number]

## 2018-10-24 ENCOUNTER — OFFICE VISIT (OUTPATIENT)
Dept: FAMILY MEDICINE CLINIC | Age: 68
End: 2018-10-24

## 2018-10-24 VITALS
HEART RATE: 60 BPM | OXYGEN SATURATION: 95 % | BODY MASS INDEX: 40.02 KG/M2 | RESPIRATION RATE: 22 BRPM | TEMPERATURE: 97.7 F | WEIGHT: 285.9 LBS | HEIGHT: 71 IN | DIASTOLIC BLOOD PRESSURE: 76 MMHG | SYSTOLIC BLOOD PRESSURE: 137 MMHG

## 2018-10-24 DIAGNOSIS — M25.561 BILATERAL CHRONIC KNEE PAIN: ICD-10-CM

## 2018-10-24 DIAGNOSIS — G89.29 BILATERAL CHRONIC KNEE PAIN: ICD-10-CM

## 2018-10-24 DIAGNOSIS — E78.2 MIXED HYPERLIPIDEMIA: ICD-10-CM

## 2018-10-24 DIAGNOSIS — M25.562 BILATERAL CHRONIC KNEE PAIN: ICD-10-CM

## 2018-10-24 DIAGNOSIS — E66.9 OBESITY (BMI 35.0-39.9 WITHOUT COMORBIDITY): ICD-10-CM

## 2018-10-24 DIAGNOSIS — G47.33 OSA (OBSTRUCTIVE SLEEP APNEA): ICD-10-CM

## 2018-10-24 DIAGNOSIS — Z13.6 SCREENING FOR ISCHEMIC HEART DISEASE: Primary | ICD-10-CM

## 2018-10-24 DIAGNOSIS — Z13.1 SCREENING FOR DIABETES MELLITUS: ICD-10-CM

## 2018-10-24 PROBLEM — E66.01 SEVERE OBESITY (HCC): Status: ACTIVE | Noted: 2018-10-24

## 2018-10-24 NOTE — PROGRESS NOTES
Weight Loss Progress Note: Initial Physician Visit      Renee Barksdale is a 77 y.o. male with BMI   39.87  who is here for his Initial Evaluation for the medical bariatric care. CC: I want knee surgery    Weight History  Current weight *285 and BMI is Body mass index is 39.87 kg/m². Goal weight lose 30 lbs as first goal    Highest weight 285  (See weight gain time line scanned into media section of chart)    Weight loss History  How many weight loss attempts have you had? Which program were you most successful doing? Significant Medical History    Have you ever taken appetite suppressants?no no   If yes: Rx or OTC? If yes; Any negative side effects? Ever diagnosed with sleep apnea or put on CPAP yes    Ever had bariatric surgery: no    Pregnant or planning on becoming pregnant w/in 6 months: no    *    Significant Psychosocial History   Any history of drug abuse or dependence: no  Current Major Lifestyle Changes: no  Any potential unsupportive people: no      History of binge eating disorder or anorexia : no   If yes, are you currently being treated no    Social History  Social History     Tobacco Use    Smoking status: Former Smoker    Smokeless tobacco: Never Used   Substance Use Topics    Alcohol use: No     How many times a week do you eat out? 2    Do you drink any EtOH?  no   If so, how much? Do you have upcoming any travel in the next 6 weeks?  no   If so, what do you have planned?           Exercise  How many days a week do you currently exercise?  0 days  Have you ever been told by a physician not to exercise?  no      Objective  Visit Vitals  /76   Pulse 60   Temp 97.7 °F (36.5 °C) (Oral)   Resp 22   Ht 5' 11\" (1.803 m)   Wt 285 lb 14.4 oz (129.7 kg)   SpO2 95%   BMI 39.87 kg/m²       Waist Circumference: See Weight Management Doc Flowsheet  Neck Circumference: See Weight Management Doc Flowsheet  Percent Body Fat: See Weight Management Doc Flowsheet  Weight Metrics 10/24/2018 10/24/2018 11/17/2017 2/11/2013   Weight - 285 lb 14.4 oz 292 lb 283 lb   Neck Circ (inches) 20.5 - - -   Waist Measure Inches 54.5 - - -   BMI - 39.87 kg/m2 41.31 kg/m2 38.37 kg/m2         Labs:       Review of Systems  Complete ROS negative except where noted above      Physical Exam    Vital Signs Reviewed  Weight Management Metrics Reviewed    Vital Signs Reviewed  Appearance: Obese, A&O x 3, NAD  HEENT:  NC/AT, EOMI, PERRL, No scleral icterus, malampatti score:  Skin:    Skin tags - no   Acanthosis Nigricans - no  Neck:  No nodes, thyromegaly no  Heart:  RRR without M/R/G  Lungs:  CTAB, no rhonchi, rales, or wheezes with good air exchange   Abdomen:  Non-tender, pos bowel sounds; hepatomegaly - no  Ext:  No C/C/E        Assessment & Plan  Diagnoses and all orders for this visit:    1. Bilateral chronic knee pain  -     REFERRAL TO PHYSICAL THERAPY      2. Screening for ischemic heart disease  -     AMB POC EKG ROUTINE W/ 12 LEADS, INTER & REP        3. Obesity (BMI 35.0-39.9 without comorbidity)  -     METABOLIC PANEL, COMPREHENSIVE  Diet Plan: keep working to avoid carbs except fruit and veggies. Activity Plan: try senior exercise using silver sneakers    Medication Plan no changes  4. Mixed hyperlipidemia  -     LIPID PANEL    5. NIKHIL (obstructive sleep apnea)  Encouraged use of the cpap each day  6. Screening for diabetes mellitus  -     HEMOGLOBIN A1C WITH EAG        Based on his history and exam, Marge Arndt is a good candidate for the [facility]           There are no Patient Instructions on file for this visit. Follow-up Disposition:  Return in about 2 weeks (around 11/7/2018). Over 50% of the 30 minutes face to face time with Jean-Pierre Beckwith consisted of counseling & coordinating and/or discussing treatment plans in reference to his obesity The primary encounter diagnosis was Screening for ischemic heart disease.  Diagnoses of Bilateral chronic knee pain, Obesity (BMI 35.0-39.9 without comorbidity), Mixed hyperlipidemia, NIKHIL (obstructive sleep apnea), and Screening for diabetes mellitus were also pertinent to this visit.

## 2018-10-24 NOTE — PROGRESS NOTES
1. Have you been to the ER, urgent care clinic since your last visit? Hospitalized since your last visit? No    2. Have you seen or consulted any other health care providers outside of the [address] [place] [address] since your last visit? Include any pap smears or colon screening.  Merced Eden, PCP      Chief Complaint   Patient presents with   [address] Weight Management     Body Weight: 285.9  Body Fat%: 36.3  Muscle Mass Weight: 39.7  Body Water Weight: 97.8  Basal Metabolic Rate: 21709  BMI: 39.87

## 2018-10-25 LAB
ALBUMIN SERPL-MCNC: 4.3 G/DL (ref 3.5–4.8)
ALBUMIN/GLOB SERPL: 1.6 {RATIO} (ref 1.2–2.2)
ALP SERPL-CCNC: 113 IU/L (ref 39–117)
ALT SERPL-CCNC: 66 IU/L (ref 0–44)
AST SERPL-CCNC: 40 IU/L (ref 0–40)
BILIRUB SERPL-MCNC: 0.8 MG/DL (ref 0–1.2)
BUN SERPL-MCNC: 21 MG/DL (ref 8–27)
BUN/CREAT SERPL: 19 (ref 10–24)
CALCIUM SERPL-MCNC: 9.8 MG/DL (ref 8.6–10.2)
CHLORIDE SERPL-SCNC: 103 MMOL/L (ref 96–106)
CHOLEST SERPL-MCNC: 226 MG/DL (ref 100–199)
CO2 SERPL-SCNC: 26 MMOL/L (ref 20–29)
CREAT SERPL-MCNC: 1.13 MG/DL (ref 0.76–1.27)
EST. AVERAGE GLUCOSE BLD GHB EST-MCNC: 131 MG/DL
GLOBULIN SER CALC-MCNC: 2.7 G/DL (ref 1.5–4.5)
GLUCOSE SERPL-MCNC: 84 MG/DL (ref 65–99)
HBA1C MFR BLD: 6.2 % (ref 4.8–5.6)
HDLC SERPL-MCNC: 24 MG/DL
INTERPRETATION, 910389: NORMAL
LDLC SERPL CALC-MCNC: 139 MG/DL (ref 0–99)
POTASSIUM SERPL-SCNC: 4.5 MMOL/L (ref 3.5–5.2)
PROT SERPL-MCNC: 7 G/DL (ref 6–8.5)
SODIUM SERPL-SCNC: 143 MMOL/L (ref 134–144)
TRIGL SERPL-MCNC: 313 MG/DL (ref 0–149)
VLDLC SERPL CALC-MCNC: 63 MG/DL (ref 5–40)

## 2018-11-01 NOTE — PROGRESS NOTES
Spoke with pt spouse Jorge Worrell and provided with lab results/recommendations. Verbalized understanding and no further questions.

## 2018-11-01 NOTE — PROGRESS NOTES
The cholesterol is very high. Avoid fried food and junk food and fast food  This needs to be rechecked in 3 months  The liver test is slightly abnormal blood   The blood sugar is in the prediabetes zone.  The same diet changes I mentioned above will help correct the blood sugar too

## 2018-11-15 ENCOUNTER — OFFICE VISIT (OUTPATIENT)
Dept: FAMILY MEDICINE CLINIC | Age: 68
End: 2018-11-15

## 2018-11-15 VITALS
HEART RATE: 61 BPM | DIASTOLIC BLOOD PRESSURE: 72 MMHG | OXYGEN SATURATION: 97 % | HEIGHT: 72 IN | TEMPERATURE: 98.1 F | WEIGHT: 273.2 LBS | BODY MASS INDEX: 37 KG/M2 | RESPIRATION RATE: 22 BRPM | SYSTOLIC BLOOD PRESSURE: 144 MMHG

## 2018-11-15 DIAGNOSIS — E66.9 OBESITY (BMI 35.0-39.9 WITHOUT COMORBIDITY): ICD-10-CM

## 2018-11-15 DIAGNOSIS — G47.33 OSA (OBSTRUCTIVE SLEEP APNEA): ICD-10-CM

## 2018-11-15 DIAGNOSIS — E78.2 MIXED HYPERLIPIDEMIA: Primary | ICD-10-CM

## 2018-11-15 NOTE — PROGRESS NOTES
1. Have you been to the ER, urgent care clinic since your last visit? Hospitalized since your last visit? No    2. Have you seen or consulted any other health care providers outside of the [address] [place] [address] since your last visit? Include any pap smears or colon screening.  No     Chief Complaint   Patient presents with    Weight Management     Body Weight: 273.2  Body Fat%: 34.6  Muscle Mass Weight: 37.5  Body Water Weight: 58.1  Basal Metabolic Rate: 4843  BMI: 37.57

## 2018-11-15 NOTE — PROGRESS NOTES
Weight Loss Progress Note: follow up Physician Visit      Lita Perez is a 77 y.o. male with BMI , 37.57,  who is here for his follow up  Evaluation for the medical bariatric care. CC: I want to be healthier    Weight History  Current weight 273 and BMI is Body mass index is 37.57 kg/m². Goal weight -30 lbs  Highest weight  285  (See weight gain time line scanned into media section of chart)        Significant Medical History    Update on sleep apnea and  CPAP 8 hours    Ever had bariatric surgery: no    Pregnant or planning on becoming pregnant w/in 6 months: no         Significant Psychosocial History   Any history of drug abuse or dependence: no  Current Major Lifestyle Changes: no  Any potential unsupportive people: no          Social History  Social History     Tobacco Use    Smoking status: Former Smoker    Smokeless tobacco: Never Used   Substance Use Topics    Alcohol use: No     How many times a week do you eat out?  0    Do you drink any EtOH?  no   If so, how much? Do you have upcoming any travel in the next 6 weeks?  no   If so, what do you have planned? Exercise  How many days a week do you currently exercise?   3 days 45 mins  Have you ever been told by a physician not to exercise?  no      Objective  Visit Vitals  /72   Pulse 61   Temp 98.1 °F (36.7 °C) (Oral)   Resp 22   Ht 5' 11.5\" (1.816 m)   Wt 273 lb 3.2 oz (123.9 kg)   SpO2 97%   BMI 37.57 kg/m²       Bicep - (right ) circumference  Waist Circumference: See Weight Management Doc Flowsheet  Neck Circumference: See Weight Management Doc Flowsheet  Percent Body Fat: See Weight Management Doc Flowsheet  Weight Metrics 11/15/2018 11/15/2018 10/24/2018 10/24/2018 11/17/2017 2/11/2013   Weight - 273 lb 3.2 oz - 285 lb 14.4 oz 292 lb 283 lb   Neck Circ (inches) 19 - 20.5 - - -   Waist Measure Inches 54 - 54.5 - - -   BMI - 37.57 kg/m2 - 39.87 kg/m2 41.31 kg/m2 38.37 kg/m2         Labs:       Review of Systems  Complete ROS negative except where noted above      Physical Exam    Vital Signs Reviewed  Weight Management Metrics Reviewed    Vital Signs Reviewed  Appearance: Obese, A&O x 3, NAD  HEENT:  NC/AT, EOMI, PERRL, No scleral icterus, malampatti score:  Skin:    Skin tags - no   Acanthosis Nigricans - no  Neck:  No nodes, thyromegaly   Heart:  RRR without M/R/G  Lungs:  CTAB, no rhonchi, rales, or wheezes with good air exchange   Abdomen:  Non-tender, pos bowel sounds; hepatomegaly -   Ext:  No C/C/E        Assessment & Plan  Diagnoses and all orders for this visit:    1. Mixed hyperlipidemia  Cont to monitor  And treat  2. Obesity (BMI 35.0-39.9 without comorbidity)  Diet: on LCD, low carb. Doing well so far      Activity:encouraged senior exercise    Medication:no changes    3. NIKHIL (obstructive sleep apnea)    Use cpap dialy    Based on his history and exam, Sarath Marvin is a good candidate for the  [email or web address] Program         There are no Patient Instructions on file for this visit. Follow-up Disposition:  Return in about 3 weeks (around 12/6/2018). Over 50% of the 30 minutes face to face time with Franchesca & Kobe consisted of counseling & coordinating and/or discussing treatment plans in reference to his obesity The primary encounter diagnosis was Mixed hyperlipidemia. Diagnoses of Obesity (BMI 35.0-39.9 without comorbidity) and NIKHIL (obstructive sleep apnea) were also pertinent to this visit.   The patient verbalizes understanding and agrees with the plan of care    Patient has the advanced directives  booklet to review

## 2018-12-06 ENCOUNTER — OFFICE VISIT (OUTPATIENT)
Dept: FAMILY MEDICINE CLINIC | Age: 68
End: 2018-12-06

## 2018-12-06 VITALS
DIASTOLIC BLOOD PRESSURE: 75 MMHG | HEIGHT: 72 IN | WEIGHT: 269.6 LBS | SYSTOLIC BLOOD PRESSURE: 136 MMHG | TEMPERATURE: 98.2 F | OXYGEN SATURATION: 95 % | BODY MASS INDEX: 36.52 KG/M2 | HEART RATE: 73 BPM | RESPIRATION RATE: 20 BRPM

## 2018-12-06 DIAGNOSIS — E66.9 OBESITY (BMI 35.0-39.9 WITHOUT COMORBIDITY): ICD-10-CM

## 2018-12-06 DIAGNOSIS — E78.2 MIXED HYPERLIPIDEMIA: Primary | ICD-10-CM

## 2018-12-06 DIAGNOSIS — G47.33 OSA (OBSTRUCTIVE SLEEP APNEA): ICD-10-CM

## 2018-12-06 NOTE — PROGRESS NOTES
Weight Loss Progress Note: follow up Physician Visit      Ranjan Ventura is a 77 y.o. male with BMI , 37.08 who is here for his follow up  Evaluation for the medical bariatric care. CC: I want to be healthier    Weight History  Current weight 269    and BMI is Body mass index is 37.08 kg/m². Goal weight  -30  Highest weight 285  (See weight gain time line scanned into media section of chart)        Significant Medical History    Update on sleep apnea and  CPAP yes, avg 8 hours    Ever had bariatric surgery: no    Pregnant or planning on becoming pregnant w/in 6 months: no      Significant Psychosocial History   Any history of drug abuse or dependence: no  Current Major Lifestyle Changes: no  Any potential unsupportive people: no          Social History  Social History     Tobacco Use    Smoking status: Former Smoker    Smokeless tobacco: Never Used   Substance Use Topics    Alcohol use: No     How many times a week do you eat out? 4    Do you drink any EtOH?  no   If so, how much? Do you have upcoming any travel in the next 6 weeks?  no   If so, what do you have planned?           Exercise  How many days a week do you currently exercise?  2 days therapy  Have you ever been told by a physician not to exercise?  no      Objective  Visit Vitals  /75   Pulse 73   Temp 98.2 °F (36.8 °C) (Oral)   Resp 20   Ht 5' 11.5\" (1.816 m)   Wt 269 lb 9.6 oz (122.3 kg)   SpO2 95%   BMI 37.08 kg/m²       Bicep - (right ) circumference  Waist Circumference: See Weight Management Doc Flowsheet  Neck Circumference: See Weight Management Doc Flowsheet  Percent Body Fat: See Weight Management Doc Flowsheet  Weight Metrics 12/6/2018 12/6/2018 11/15/2018 11/15/2018 10/24/2018 10/24/2018 11/17/2017   Weight - 269 lb 9.6 oz - 273 lb 3.2 oz - 285 lb 14.4 oz 292 lb   Neck Circ (inches) 19 - 19 - 20.5 - -   Waist Measure Inches 52.5 - 54 - 54.5 - -   BMI - 37.08 kg/m2 - 37.57 kg/m2 - 39.87 kg/m2 41.31 kg/m2 Labs:       Review of Systems  Complete ROS negative except where noted above      Physical Exam    Vital Signs Reviewed  Weight Management Metrics Reviewed    Vital Signs Reviewed  Appearance: Obese, A&O x 3, NAD  HEENT:  NC/AT, EOMI, PERRL, No scleral icterus, malampatti score:  Skin:    Skin tags - no   Acanthosis Nigricans - no  Neck:  No nodes, thyromegaly   Heart:  RRR without M/R/G  Lungs:  CTAB, no rhonchi, rales, or wheezes with good air exchange   Abdomen:  Non-tender, pos bowel sounds; hepatomegaly -   Ext:  No C/C/E        Assessment & Plan  Diagnoses and all orders for this visit:    1. Mixed hyperlipidemia  Monitor and treat as indicated  2. Obesity (BMI 35.0-39.9 without comorbidity)  Diet   Has lost 16 pounds so far, doing well    Activity: encouraged exercise in pool or chair exercise at least 15-20 mins a day and cont to increase    Medication:no changes    3. NIKHIL (obstructive sleep apnea)  I explined the importance of treating NIKHIL for weight loss. Untreated the patient becomes resistant to weight loss      Based on his history and exam, Kirby Hartmann is a good candidate for the  [facility] Loss Program         There are no Patient Instructions on file for this visit. Follow-up Disposition: Not on File    Over 50% of the 30minutes face to face time with Chris Larios consisted of counseling & coordinating and/or discussing treatment plans in reference to his obesity The primary encounter diagnosis was Mixed hyperlipidemia. Diagnoses of Obesity (BMI 35.0-39.9 without comorbidity) and NIKHIL (obstructive sleep apnea) were also pertinent to this visit.   The patient verbalizes understanding and agrees with the plan of care    Patient has the advanced directives  booklet to review

## 2018-12-06 NOTE — PROGRESS NOTES
1. Have you been to the ER, urgent care clinic since your last visit? Hospitalized since your last visit? No    2. Have you seen or consulted any other health care providers outside of the [address] [place] [address] since your last visit? Include any pap smears or colon screening.  Dr. Natanael Cox, PCP      Chief Complaint   Patient presents with   [place] Weight [facility]     Body Weight: 269.6  Body Fat%: 34.2  Muscle Mass Weight: 37.0  Body Water Weight: 71.3  Basal Metabolic Rate: 6305  BMI: 37.08

## 2019-01-08 ENCOUNTER — OFFICE VISIT (OUTPATIENT)
Dept: FAMILY MEDICINE CLINIC | Age: 69
End: 2019-01-08

## 2019-01-08 VITALS
TEMPERATURE: 97.8 F | WEIGHT: 262.25 LBS | BODY MASS INDEX: 35.52 KG/M2 | HEART RATE: 68 BPM | OXYGEN SATURATION: 95 % | SYSTOLIC BLOOD PRESSURE: 132 MMHG | RESPIRATION RATE: 19 BRPM | DIASTOLIC BLOOD PRESSURE: 72 MMHG | HEIGHT: 72 IN

## 2019-01-08 DIAGNOSIS — G47.33 OSA (OBSTRUCTIVE SLEEP APNEA): ICD-10-CM

## 2019-01-08 DIAGNOSIS — E78.2 MIXED HYPERLIPIDEMIA: Primary | ICD-10-CM

## 2019-01-08 DIAGNOSIS — E66.9 OBESITY (BMI 35.0-39.9 WITHOUT COMORBIDITY): ICD-10-CM

## 2019-01-08 NOTE — PROGRESS NOTES
1. Have you been to the ER, urgent care clinic since your last visit? Hospitalized since your last visit? No    2. Have you seen or consulted any other health care providers outside of the [address] [place] [address] since your last visit? Include any pap smears or colon screening.  No   Chief Complaint   Patient presents with    Weight Management     Body Weight: 262.4  Body Fat%: 34.0  Muscle Mass Weight: 36.8  Body Water Weight: 17.7  Basal Metabolic Rate: 3790  BMI: 36.07

## 2019-01-08 NOTE — PATIENT INSTRUCTIONS
Address:   [facility]  [address] [place] [address] [address], [place], [address] [facility]   Hours:   Closes soon: 5PM ? Opens 8:30AM Wed                                 Phone: [phone]

## 2019-01-08 NOTE — PROGRESS NOTES
Weight Loss Progress Note: follow up Physician Visit      Scott Mcneill is a 77 y.o. male with BMI , 36.07 who is here for his follow up  Evaluation for the medical bariatric care. CC: I want to be healthier        Having a problem with the cpap mask  He wears it 6 hours a night  He has not exercised since danny  He did eat chips and other junk during the holiday but has stopped now    Weight History  Current weight 262 and BMI is Body mass index is 36.07 kg/m². Goal weight  -30(255 lbs)  Highest weight 285   (See weight gain time line scanned into media section of chart)        Significant Medical History    Update on sleep apnea and  CPAP yes    Ever had bariatric surgery: no    Pregnant or planning on becoming pregnant w/in 6 months: no         Significant Psychosocial History   Any history of drug abuse or dependence: no  Current Major Lifestyle Changes: no  Any potential unsupportive people: no          Social History  Social History     Tobacco Use    Smoking status: Former Smoker    Smokeless tobacco: Never Used   Substance Use Topics    Alcohol use: No     How many times a week do you eat out? Do you drink any EtOH?  no   If so, how much? Do you have upcoming any travel in the next 6 weeks?  no   If so, what do you have planned?         Exercise  How many days a week do you currently exercise?  0 days  Have you ever been told by a physician not to exercise?  no      Objective  Visit Vitals  Ht 5' 11.5\" (1.816 m)   Wt 262 lb 4 oz (119 kg)   BMI 36.07 kg/m²       Bicep - (right ) circumference  Waist Circumference: See Weight Management Doc Flowsheet  Neck Circumference: See Weight Management Doc Flowsheet  Percent Body Fat: See Weight Management Doc Flowsheet  Weight Metrics 1/8/2019 1/8/2019 12/6/2018 12/6/2018 11/15/2018 11/15/2018 10/24/2018   Weight - 262 lb 4 oz - 269 lb 9.6 oz - 273 lb 3.2 oz -   Neck Circ (inches) 19 - 19 - 19 - 20.5   Waist Measure Inches 52 - 52.5 - 54 - 54.5   BMI - 36.07 kg/m2 - 37.08 kg/m2 - 37.57 kg/m2 -         Labs:       Review of Systems  Complete ROS negative except where noted above      Physical Exam    Vital Signs Reviewed  Weight Management Metrics Reviewed    Vital Signs Reviewed  Appearance: Obese, A&O x 3, NAD  HEENT:  NC/AT, EOMI, PERRL, No scleral icterus, malampatti score:  Skin:    Skin tags - no   Acanthosis Nigricans - no  Neck:  No nodes, thyromegaly   Heart:  RRR without M/R/G  Lungs:  CTAB, no rhonchi, rales, or wheezes with good air exchange   Abdomen:  Non-tender, pos bowel sounds; hepatomegaly -   Ext:  No C/C/E        Assessment & Plan  Diagnoses and all orders for this visit:    1. Mixed hyperlipidemia  Cont to monitor and treat as needed  2. Obesity (BMI 35.0-39.9 without comorbidity)  Diet:cont the low carb, lcd    Activity:get back on the 150 minutes per week or princess    Medication:no changes    3. NIKHIL (obstructive sleep apnea)        Based on his history and exam, Milagros Hair is a good candidate for the  [facility]         There are no Patient Instructions on file for this visit. Follow-up Disposition:  Return in about 3 weeks (around 1/29/2019). Over 50% of the 30 minutes face to face time with Cora Wall consisted of counseling & coordinating and/or discussing treatment plans in reference to his obesity The primary encounter diagnosis was Mixed hyperlipidemia. Diagnoses of Obesity (BMI 35.0-39.9 without comorbidity) and NIKHIL (obstructive sleep apnea) were also pertinent to this visit.   The patient verbalizes understanding and agrees with the plan of care    Patient has the advanced directives  booklet to review

## 2019-02-11 ENCOUNTER — OFFICE VISIT (OUTPATIENT)
Dept: FAMILY MEDICINE CLINIC | Age: 69
End: 2019-02-11

## 2019-02-11 VITALS
RESPIRATION RATE: 20 BRPM | OXYGEN SATURATION: 96 % | DIASTOLIC BLOOD PRESSURE: 75 MMHG | BODY MASS INDEX: 35.87 KG/M2 | HEART RATE: 57 BPM | WEIGHT: 264.8 LBS | HEIGHT: 72 IN | TEMPERATURE: 97.7 F | SYSTOLIC BLOOD PRESSURE: 125 MMHG

## 2019-02-11 DIAGNOSIS — E66.9 OBESITY (BMI 35.0-39.9 WITHOUT COMORBIDITY): ICD-10-CM

## 2019-02-11 DIAGNOSIS — R73.9 BLOOD GLUCOSE ELEVATED: ICD-10-CM

## 2019-02-11 DIAGNOSIS — G47.33 OSA (OBSTRUCTIVE SLEEP APNEA): ICD-10-CM

## 2019-02-11 DIAGNOSIS — E78.2 MIXED HYPERLIPIDEMIA: Primary | ICD-10-CM

## 2019-02-11 DIAGNOSIS — R79.89 ABNORMAL LIVER FUNCTION TEST: ICD-10-CM

## 2019-02-11 NOTE — PROGRESS NOTES
1. Have you been to the ER, urgent care clinic since your last visit? Hospitalized since your last visit? No    2. Have you seen or consulted any other health care providers outside of the [address] [place] [address] since your last visit? Include any pap smears or colon screening.  No     Chief Complaint   Patient presents with    Weight Management     Body Weight: 264.8  Body Fat%: 33.6  Muscle Mass Weight: 36.3  Body Water Weight: 26.3  Basal Metabolic Rate: 8063  BMI: 36.42

## 2019-02-11 NOTE — PROGRESS NOTES
Weight Loss Progress Note: follow up Physician Visit      Heena Ordonez is a 77 y.o. male with BMI , 36.42  who is here for his follow up  Evaluation for the medical bariatric care. CC: I want to be healthier    He has been eating popcorn most days  He gets the skinny girl and adds butter  He     Weight History  Current weight 264 and BMI is Body mass index is 36.42 kg/m². Goal weight 255  Highest weight 285  (See weight gain time line scanned into media section of chart)        Significant Medical History    Update on sleep apnea and  CPAP yes    Ever had bariatric surgery: no    Pregnant or planning on becoming pregnant w/in 6 months: no         Significant Psychosocial History   Any history of drug abuse or dependence: no  Current Major Lifestyle Changes: no  Any potential unsupportive people: no          Social History  Social History     Tobacco Use    Smoking status: Former Smoker    Smokeless tobacco: Never Used   Substance Use Topics    Alcohol use: No     How many times a week do you eat out? 1    Do you drink any EtOH?  no   If so, how much? Do you have upcoming any travel in the next 6 weeks?  no   If so, what do you have planned? Exercise  How many days a week do you currently exercise?   1-2 days  Have you ever been told by a physician not to exercise?  no      Objective  Visit Vitals  /75   Pulse (!) 57   Temp 97.7 °F (36.5 °C) (Oral)   Resp 20   Ht 5' 11.5\" (1.816 m)   Wt 264 lb 12.8 oz (120.1 kg)   SpO2 96%   BMI 36.42 kg/m²       Bicep - (right ) circumference  Waist Circumference: See Weight Management Doc Flowsheet  Neck Circumference: See Weight Management Doc Flowsheet  Percent Body Fat: See Weight Management Doc Flowsheet  Weight Metrics 2/11/2019 2/11/2019 1/8/2019 1/8/2019 12/6/2018 12/6/2018 11/15/2018   Weight - 264 lb 12.8 oz - 262 lb 4 oz - 269 lb 9.6 oz -   Neck Circ (inches) - - 19 - 19 - 19   Waist Measure Inches 51 - 52 - 52.5 - 54   BMI - 36.42 kg/m2 - 36.07 kg/m2 - 37.08 kg/m2 -         Labs:       Review of Systems  Complete ROS negative except where noted above      Physical Exam    Vital Signs Reviewed  Weight Management Metrics Reviewed    Vital Signs Reviewed  Appearance: Obese, A&O x 3, NAD  HEENT:  NC/AT, EOMI, PERRL, No scleral icterus, malampatti score:  Skin:    Skin tags - no   Acanthosis Nigricans - no  Neck:  No nodes, thyromegaly   Heart:  RRR without M/R/G  Lungs:  CTAB, no rhonchi, rales, or wheezes with good air exchange   Abdomen:  Non-tender, pos bowel sounds; hepatomegaly -   Ext:  No C/C/E        Assessment & Plan  Diagnoses and all orders for this visit:    1. Mixed hyperlipidemia  Needs to be repeated in may. Likely will be better with the changes he is making  2. Obesity (BMI 35.0-39.9 without comorbidity)  Diet:continue focus on low carb    Activity: aiming for 150 mins a week    Medication:no changes    3. NIKHIL (obstructive sleep apnea)    Encouraged him to cont use of cpap each night    Based on his history and exam, Paty Bardales is a good candidate for the  [facility]         There are no Patient Instructions on file for this visit. Follow-up Disposition:  Return in about 2 weeks (around 2/25/2019). Over 50% of the is minutes face to face time with Julito Hair consisted of counseling & coordinating and/or discussing treatment plans in reference to his obesity The primary encounter diagnosis was Mixed hyperlipidemia. Diagnoses of Obesity (BMI 35.0-39.9 without comorbidity), NIKHIL (obstructive sleep apnea), Abnormal liver function test, and Blood glucose elevated were also pertinent to this visit.   The patient verbalizes understanding and agrees with the plan of care    Patient has the advanced directives  booklet to review

## 2019-02-12 LAB
ALBUMIN SERPL-MCNC: 4.5 G/DL (ref 3.5–4.8)
ALBUMIN/GLOB SERPL: 1.7 {RATIO} (ref 1.2–2.2)
ALP SERPL-CCNC: 120 IU/L (ref 39–117)
ALT SERPL-CCNC: 23 IU/L (ref 0–44)
AST SERPL-CCNC: 13 IU/L (ref 0–40)
BILIRUB SERPL-MCNC: 0.8 MG/DL (ref 0–1.2)
BUN SERPL-MCNC: 20 MG/DL (ref 8–27)
BUN/CREAT SERPL: 16 (ref 10–24)
CALCIUM SERPL-MCNC: 9.6 MG/DL (ref 8.6–10.2)
CHLORIDE SERPL-SCNC: 103 MMOL/L (ref 96–106)
CHOLEST SERPL-MCNC: 218 MG/DL (ref 100–199)
CO2 SERPL-SCNC: 27 MMOL/L (ref 20–29)
CREAT SERPL-MCNC: 1.25 MG/DL (ref 0.76–1.27)
EST. AVERAGE GLUCOSE BLD GHB EST-MCNC: 117 MG/DL
GLOBULIN SER CALC-MCNC: 2.6 G/DL (ref 1.5–4.5)
GLUCOSE SERPL-MCNC: 90 MG/DL (ref 65–99)
HBA1C MFR BLD: 5.7 % (ref 4.8–5.6)
HDLC SERPL-MCNC: 25 MG/DL
INTERPRETATION, 910389: NORMAL
INTERPRETATION: NORMAL
LDLC SERPL CALC-MCNC: 150 MG/DL (ref 0–99)
PDF IMAGE, 910387: NORMAL
POTASSIUM SERPL-SCNC: 4.6 MMOL/L (ref 3.5–5.2)
PROT SERPL-MCNC: 7.1 G/DL (ref 6–8.5)
SODIUM SERPL-SCNC: 143 MMOL/L (ref 134–144)
TRIGL SERPL-MCNC: 213 MG/DL (ref 0–149)
VLDLC SERPL CALC-MCNC: 43 MG/DL (ref 5–40)

## 2019-02-20 NOTE — PROGRESS NOTES
The blood sugar is much better but not quite normal  The cholesterol levels are high.  Avoid fast food, fried food and junk food  The liver test is better but not totally normal  Eat lots of vegetables and small portions of  lean meats that are not fried

## 2019-02-22 NOTE — PROGRESS NOTES
Spoke with pt advised of lab results/recommendaitons. Pt verbalized understanding and no further questions.

## 2019-02-28 ENCOUNTER — OFFICE VISIT (OUTPATIENT)
Dept: FAMILY MEDICINE CLINIC | Age: 69
End: 2019-02-28

## 2019-02-28 VITALS
TEMPERATURE: 97.6 F | DIASTOLIC BLOOD PRESSURE: 71 MMHG | OXYGEN SATURATION: 96 % | WEIGHT: 262.6 LBS | BODY MASS INDEX: 35.57 KG/M2 | HEIGHT: 72 IN | RESPIRATION RATE: 20 BRPM | SYSTOLIC BLOOD PRESSURE: 124 MMHG | HEART RATE: 55 BPM

## 2019-02-28 DIAGNOSIS — R73.9 BLOOD GLUCOSE ELEVATED: ICD-10-CM

## 2019-02-28 DIAGNOSIS — G47.33 OSA (OBSTRUCTIVE SLEEP APNEA): ICD-10-CM

## 2019-02-28 DIAGNOSIS — E66.9 OBESITY (BMI 35.0-39.9 WITHOUT COMORBIDITY): ICD-10-CM

## 2019-02-28 DIAGNOSIS — E78.2 MIXED HYPERLIPIDEMIA: Primary | ICD-10-CM

## 2019-02-28 DIAGNOSIS — R79.89 ABNORMAL LIVER FUNCTION TEST: ICD-10-CM

## 2019-02-28 NOTE — PROGRESS NOTES
Weight Loss Progress Note: follow up Physician Visit      Helen Mcclendon is a 77 y.o. male with BMI , 35.61 who is here for his follow up  Evaluation for the medical bariatric care. CC: I want to be healthy    Weight History  Current weight 262 and BMI is Body mass index is 35.61 kg/m². Goal weight , 255  Highest weight * 285  (See weight gain time line scanned into media section of chart)        Significant Medical History    Update on sleep apnea and  CPAP yes    Ever had bariatric surgery: no    Pregnant or planning on becoming pregnant w/in 6 months: no         Significant Psychosocial History   Any history of drug abuse or dependence: no  Current Major Lifestyle Changes: no  Any potential unsupportive people: no          Social History  Social History     Tobacco Use    Smoking status: Former Smoker    Smokeless tobacco: Never Used   Substance Use Topics    Alcohol use: No     How many times a week do you eat out? Do you drink any EtOH?  no   If so, how much? Do you have upcoming any travel in the next 6 weeks?  no   If so, what do you have planned? Exercise  How many days a week do you currently exercise?   Physical work 5  Days this week  Have you ever been told by a physician not to exercise?  no      Objective  Visit Vitals  /71   Pulse (!) 55   Temp 97.6 °F (36.4 °C) (Oral)   Resp 20   Ht 6' (1.829 m)   Wt 262 lb 9.6 oz (119.1 kg)   SpO2 96%   BMI 35.61 kg/m²       Bicep - (right ) circumference  Waist Circumference: See Weight Management Doc Flowsheet  Neck Circumference: See Weight Management Doc Flowsheet  Percent Body Fat: See Weight Management Doc Flowsheet  Weight Metrics 2/28/2019 2/28/2019 2/11/2019 2/11/2019 1/8/2019 1/8/2019 12/6/2018   Weight - 262 lb 9.6 oz - 264 lb 12.8 oz - 262 lb 4 oz -   Neck Circ (inches) 19 - - - 19 - 19   Waist Measure Inches 49.5 - 51 - 52 - 52.5   BMI - 35.61 kg/m2 - 36.42 kg/m2 - 36.07 kg/m2 -         Labs:       Review of Systems  Complete ROS negative except where noted above      Physical Exam    Vital Signs Reviewed  Weight Management Metrics Reviewed    Vital Signs Reviewed  Appearance: Obese, A&O x 3, NAD  HEENT:  NC/AT, EOMI, PERRL, No scleral icterus, malampatti score:  Skin:    Skin tags - no   Acanthosis Nigricans - no  Neck:  No nodes, thyromegaly   Heart:  RRR without M/R/G  Lungs:  CTAB, no rhonchi, rales, or wheezes with good air exchange   Abdomen:  Non-tender, pos bowel sounds; hepatomegaly -   Ext:  No C/C/E        Assessment & Plan  Diagnoses and all orders for this visit:    1. Mixed hyperlipidemia  Improved but still high  2. 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 If the liver function does not normalize soon I will stop it    3. NIKHIL (obstructive sleep apnea)  Using cpap  4. Abnormal liver function test  The liver function is better in some waysALT now normal, alk phos very slightly above normal. Recheck in may  5. Blood glucose elevated  Has improved, a1c went down from 6.2 to 5.7      Based on his history and exam, Sherlyn Roman is a good candidate for the  [facility]         There are no Patient Instructions on file for this visit. Follow-up Disposition: Not on File    Over 50% of the 30 minutes face to face time with Isa Garcia consisted of counseling & coordinating and/or discussing treatment plans in reference to his obesity The primary encounter diagnosis was Mixed hyperlipidemia. Diagnoses of Obesity (BMI 35.0-39.9 without comorbidity), NIKHIL (obstructive sleep apnea), Abnormal liver function test, and Blood glucose elevated were also pertinent to this visit.   The patient verbalizes understanding and agrees with the plan of care    Patient has the advanced directives  booklet to review

## 2019-02-28 NOTE — PROGRESS NOTES
1. Have you been to the ER, urgent care clinic since your last visit? Hospitalized since your last visit? No    2. Have you seen or consulted any other health care providers outside of the [address] [place] [address] since your last visit? Include any pap smears or colon screening.  No    Chief Complaint   Patient presents with    Weight Management     Body Weight: 262.6  Body Fat%: 33.0  Muscle Mass Weight: 35.6  Body Water Weight: 54.7  Basal Metabolic Rate: 0891  BMI: 35.61

## 2019-03-19 ENCOUNTER — OFFICE VISIT (OUTPATIENT)
Dept: FAMILY MEDICINE CLINIC | Age: 69
End: 2019-03-19

## 2019-03-19 VITALS
SYSTOLIC BLOOD PRESSURE: 131 MMHG | RESPIRATION RATE: 21 BRPM | HEART RATE: 54 BPM | OXYGEN SATURATION: 96 % | BODY MASS INDEX: 35.24 KG/M2 | DIASTOLIC BLOOD PRESSURE: 72 MMHG | TEMPERATURE: 97.7 F | HEIGHT: 72 IN | WEIGHT: 260.2 LBS

## 2019-03-19 DIAGNOSIS — R73.9 BLOOD GLUCOSE ELEVATED: ICD-10-CM

## 2019-03-19 DIAGNOSIS — E78.2 MIXED HYPERLIPIDEMIA: Primary | ICD-10-CM

## 2019-03-19 DIAGNOSIS — E66.9 OBESITY (BMI 35.0-39.9 WITHOUT COMORBIDITY): ICD-10-CM

## 2019-03-19 DIAGNOSIS — R79.89 ABNORMAL LIVER FUNCTION TEST: ICD-10-CM

## 2019-03-19 DIAGNOSIS — G47.33 OSA (OBSTRUCTIVE SLEEP APNEA): ICD-10-CM

## 2019-03-19 NOTE — PROGRESS NOTES
Weight Loss Progress Note: follow up Physician Visit      Luellen Primrose is a 77 y.o. male with BMI , 35.29 who is here for his follow up  Evaluation for the medical bariatric care. CC: I want to be healthier    Weight History  Current weight 260 and BMI is Body mass index is 35.29 kg/m². Goal weight  255 first goal( now adjust to 220)  Highest weight *285  (See weight gain time line scanned into media section of chart)        Significant Medical History    Update on sleep apnea and  CPAP yes    Ever had bariatric surgery: no    Pregnant or planning on becoming pregnant w/in 6 months: no      Significant Psychosocial History   Any history of drug abuse or dependence: no  Current Major Lifestyle Changes: no  Any potential unsupportive people: no          Social History  Social History     Tobacco Use    Smoking status: Former Smoker    Smokeless tobacco: Never Used   Substance Use Topics    Alcohol use: No     How many times a week do you eat out?  0-1    Do you drink any EtOH?  no   If so, how much? Do you have upcoming any travel in the next 6 weeks?  no   If so, what do you have planned? Exercise  How many days a week do you currently exercise?   Daily activity in household chores days  Have you ever been told by a physician not to exercise?  no      Objective  Visit Vitals  /72   Pulse (!) 54   Temp 97.7 °F (36.5 °C) (Oral)   Resp 21   Ht 6' (1.829 m)   Wt 260 lb 3.2 oz (118 kg)   SpO2 96%   BMI 35.29 kg/m²       Bicep - (right ) circumference  Waist Circumference: See Weight Management Doc Flowsheet  Neck Circumference: See Weight Management Doc Flowsheet  Percent Body Fat: See Weight Management Doc Flowsheet  Weight Metrics 3/19/2019 3/19/2019 2/28/2019 2/28/2019 2/11/2019 2/11/2019 1/8/2019   Weight - 260 lb 3.2 oz - 262 lb 9.6 oz - 264 lb 12.8 oz -   Neck Circ (inches) 18.5 - 19 - - - 19   Waist Measure Inches 49.5 - 49.5 - 51 - 52   BMI - 35.29 kg/m2 - 35.61 kg/m2 - 36.42 kg/m2 -         Labs:       Review of Systems  Complete ROS negative except where noted above      Physical Exam    Vital Signs Reviewed  Weight Management Metrics Reviewed    Vital Signs Reviewed  Appearance: Obese, A&O x 3, NAD  HEENT:  NC/AT, EOMI, PERRL, No scleral icterus, malampatti score:  Skin:    Skin tags - no   Acanthosis Nigricans - no  Neck:  No nodes, thyromegaly   Heart:  RRR without M/R/G  Lungs:  CTAB, no rhonchi, rales, or wheezes with good air exchange   Abdomen:  Non-tender, pos bowel sounds; hepatomegaly -   Ext:  No C/C/E        Assessment & Plan  Diagnoses and all orders for this visit:    1. Mixed hyperlipidemia  Taking statins. Proper diet is still needed  2. Obesity (BMI 35.0-39.9 without comorbidity)  Diet:cont a lcd    Activity:cont to be as active as tolerated    Medication: no changes    3. NIKHIL (obstructive sleep apnea)  Using the cpap  4. Abnormal liver function test  Recheck in may  5. Blood glucose elevated  Recheck in may      Based on his history and exam, Butch Vaughan is a good candidate for the  [facility]         There are no Patient Instructions on file for this visit. Follow-up Disposition:  Return in about 3 weeks (around 4/9/2019). Over 50% of the 30 minutes face to face time with Elizabeth Mckinley consisted of counseling & coordinating and/or discussing treatment plans in reference to his obesity The primary encounter diagnosis was Mixed hyperlipidemia. Diagnoses of Obesity (BMI 35.0-39.9 without comorbidity), NIKHIL (obstructive sleep apnea), Abnormal liver function test, and Blood glucose elevated were also pertinent to this visit.   The patient verbalizes understanding and agrees with the plan of care    Patient has the advanced directives  booklet to review

## 2019-03-19 NOTE — PROGRESS NOTES
1. Have you been to the ER, urgent care clinic since your last visit? Hospitalized since your last visit? No    2. Have you seen or consulted any other health care providers outside of the [address] [place] [address] since your last visit? Include any pap smears or colon screening.  No     Chief Complaint   Patient presents with    Weight Management     Body Weight: 260.2  Body Fat%: 32.7  Muscle Mass Weight: 35.3  Body Water Weight: 82.2  Basal Metabolic Rate: 9204  BMI: 35.29

## 2019-04-08 ENCOUNTER — OFFICE VISIT (OUTPATIENT)
Dept: FAMILY MEDICINE CLINIC | Age: 69
End: 2019-04-08

## 2019-04-08 VITALS
HEART RATE: 64 BPM | BODY MASS INDEX: 34.63 KG/M2 | WEIGHT: 255.7 LBS | OXYGEN SATURATION: 95 % | TEMPERATURE: 98.1 F | RESPIRATION RATE: 20 BRPM | SYSTOLIC BLOOD PRESSURE: 115 MMHG | DIASTOLIC BLOOD PRESSURE: 71 MMHG | HEIGHT: 72 IN

## 2019-04-08 DIAGNOSIS — K43.2 INCISIONAL HERNIA, WITHOUT OBSTRUCTION OR GANGRENE: ICD-10-CM

## 2019-04-08 DIAGNOSIS — R79.89 ABNORMAL LIVER FUNCTION TEST: ICD-10-CM

## 2019-04-08 DIAGNOSIS — G47.33 OSA (OBSTRUCTIVE SLEEP APNEA): ICD-10-CM

## 2019-04-08 DIAGNOSIS — E66.9 OBESITY (BMI 35.0-39.9 WITHOUT COMORBIDITY): ICD-10-CM

## 2019-04-08 DIAGNOSIS — E78.2 MIXED HYPERLIPIDEMIA: Primary | ICD-10-CM

## 2019-04-08 RX ORDER — TOPIRAMATE 25 MG/1
25 TABLET ORAL
Qty: 30 TAB | Refills: 6 | Status: SHIPPED | OUTPATIENT
Start: 2019-04-08 | End: 2021-09-10

## 2019-04-08 NOTE — PROGRESS NOTES
1. Have you been to the ER, urgent care clinic since your last visit? Hospitalized since your last visit? No    2. Have you seen or consulted any other health care providers outside of the [address] [place] [address] since your last visit? Include any pap smears or colon screening.  No     Chief Complaint   Patient presents with    Weight Management     Body Weight: 255.7  Body Fat%: 32.1  Muscle Mass Weight: 34.7  Body Water Weight: 94.9  Basal Metabolic Rate: 1381  BMI: 34.68

## 2019-04-08 NOTE — PROGRESS NOTES
Weight Loss Progress Note: follow up Physician Visit      Marino Mcintosh is a 77 y.o. male with BMI , 34.68   who is here for his follow up  Evaluation for the medical bariatric care. CC: I want to be healthier  He is doing well. Has lost another 5 lbs  He is doing a lot of yard work  Still eating sweets and starches but a lot less  He notes a protrusion from the abd. He has had more than one surgical procedure in the area. He thinks he has a hernia    Weight History  Current weight 255 and BMI is Body mass index is 34.68 kg/m². Goal weight  255( continue working)  Highest weight 285   (See weight gain time line scanned into media section of chart)        Significant Medical History    Update on sleep apnea and  CPAP yes    Ever had bariatric surgery: no    Pregnant or planning on becoming pregnant w/in 6 months: no         Significant Psychosocial History   Any history of drug abuse or dependence: no  Current Major Lifestyle Changes: no  Any potential unsupportive people: no          Social History  Social History     Tobacco Use    Smoking status: Former Smoker    Smokeless tobacco: Never Used   Substance Use Topics    Alcohol use: No     How many times a week do you eat out? A lot of times at [employer], having grilled chicken brocolli and side salad    Do you drink any EtOH?  no   If so, how much? Do you have upcoming any travel in the next 6 weeks?  no   If so, what do you have planned?           Exercise  How many days a week do you currently exercise?  most days  Have you ever been told by a physician not to exercise?  no      Objective  Visit Vitals  /71   Pulse 64   Temp 98.1 °F (36.7 °C) (Oral)   Resp 20   Ht 6' (1.829 m)   Wt 255 lb 11.2 oz (116 kg)   SpO2 95%   BMI 34.68 kg/m²       Bicep - (right ) circumference  Waist Circumference: See Weight Management Doc Flowsheet  Neck Circumference: See Weight Management Doc Flowsheet  Percent Body Fat: See Weight Management Doc Flowsheet  Weight Metrics 4/8/2019 4/8/2019 3/19/2019 3/19/2019 2/28/2019 2/28/2019 2/11/2019   Weight - 255 lb 11.2 oz - 260 lb 3.2 oz - 262 lb 9.6 oz -   Neck Circ (inches) 18.25 - 18.5 - 19 - -   Waist Measure Inches 50.25 - 49.5 - 49.5 - 51   BMI - 34.68 kg/m2 - 35.29 kg/m2 - 35.61 kg/m2 -         Labs:     Review of Systems  Complete ROS negative except where noted above      Physical Exam    Vital Signs Reviewed  Weight Management Metrics Reviewed    Vital Signs Reviewed  Appearance: Obese, A&O x 3, NAD  HEENT:  NC/AT, EOMI, PERRL, No scleral icterus, malampatti score:  Skin:    Skin tags - no   Acanthosis Nigricans - no  Neck:  No nodes, thyromegaly   Heart:  RRR without M/R/G  Lungs:  CTAB, no rhonchi, rales, or wheezes with good air exchange   Abdomen:  Non-tender, pos bowel sounds; hepatomegaly -   Protrusion in midline wjhen goig from  Supine to sitting  Ext:  No C/C/E        Assessment & Plan  Diagnoses and all orders for this visit:    1. Mixed hyperlipidemia  Cont to monitor   2. Obesity (BMI 35.0-39.9 without comorbidity)  Diet:he is having deserts fairly often  I asked him to limit desert to once a week  His abd is still very large  He has what appears to be a hernia and still needs to lose a lot more weight to make surgical intervention work long term  He has reached the 30 lb gaol but abd it still very large, needs a lot more weigt loss  Activity: start walking in addition to the yrd work    Medication: no changes    3. NIKHIL (obstructive sleep apnea)  Using cpap  4. Abnormal liver function test  Cont to monitor      Based on his history and exam, Tony Sage is a good candidate for the  [facility]         There are no Patient Instructions on file for this visit.         Over 50% of the 30 minutes face to face time with Giovana Covington consisted of counseling & coordinating and/or discussing treatment plans in reference to his obesity The primary encounter diagnosis was Mixed hyperlipidemia. Diagnoses of Obesity (BMI 35.0-39.9 without comorbidity), NIKHIL (obstructive sleep apnea), Abnormal liver function test, and Incisional hernia, without obstruction or gangrene were also pertinent to this visit.   The patient verbalizes understanding and agrees with the plan of care    Patient has the advanced directives  booklet to review

## 2019-04-29 ENCOUNTER — OFFICE VISIT (OUTPATIENT)
Dept: FAMILY MEDICINE CLINIC | Age: 69
End: 2019-04-29

## 2019-04-29 VITALS
HEIGHT: 72 IN | WEIGHT: 257.5 LBS | BODY MASS INDEX: 34.88 KG/M2 | TEMPERATURE: 97.6 F | RESPIRATION RATE: 20 BRPM | SYSTOLIC BLOOD PRESSURE: 136 MMHG | DIASTOLIC BLOOD PRESSURE: 74 MMHG | OXYGEN SATURATION: 99 % | HEART RATE: 54 BPM

## 2019-04-29 DIAGNOSIS — E66.9 OBESITY (BMI 35.0-39.9 WITHOUT COMORBIDITY): ICD-10-CM

## 2019-04-29 DIAGNOSIS — E78.2 MIXED HYPERLIPIDEMIA: Primary | ICD-10-CM

## 2019-04-29 DIAGNOSIS — R79.89 ABNORMAL LIVER FUNCTION TEST: ICD-10-CM

## 2019-04-29 DIAGNOSIS — G47.33 OSA (OBSTRUCTIVE SLEEP APNEA): ICD-10-CM

## 2019-04-29 NOTE — PROGRESS NOTES
Weight Loss Progress Note: follow up Physician Visit      Marino Mcintosh is a 77 y.o. male with BMI , 34.92 who is here for his follow up  Evaluation for the medical bariatric care. CC: I want to be healthier  Travelled 10 days . Ate at [employer] [place] the [place]. He made low carb choices  Not much xercise    Weight History  Current weight 257 and BMI is Body mass index is 34.92 kg/m². Goal weight 255   Highest weight 285   (See weight gain time line scanned into media section of chart)        Significant Medical History    Update on sleep apnea and  CPAP no    Ever had bariatric surgery: no    Pregnant or planning on becoming pregnant w/in 6 months: no     *    Significant Psychosocial History   Any history of drug abuse or dependence: no  Current Major Lifestyle Changes: no  Any potential unsupportive people: no          Social History  Social History     Tobacco Use    Smoking status: Former Smoker    Smokeless tobacco: Never Used   Substance Use Topics    Alcohol use: No     How many times a week do you eat out?  many    Do you drink any EtOH?  no   If so, how much? Do you have upcoming any travel in the next 6 weeks?  no   If so, what do you have planned? Exercise  How many days a week do you currently exercise?   Few days here and there days  Have you ever been told by a physician not to exercise?  no      Objective  Visit Vitals  /74   Pulse (!) 54   Temp 97.6 °F (36.4 °C) (Oral)   Resp 20   Ht 6' (1.829 m)   Wt 257 lb 8 oz (116.8 kg)   SpO2 99%   BMI 34.92 kg/m²       Bicep - (right ) circumference  Waist Circumference: See Weight Management Doc Flowsheet  Neck Circumference: See Weight Management Doc Flowsheet  Percent Body Fat: See Weight Management Doc Flowsheet  Weight Metrics 4/29/2019 4/29/2019 4/8/2019 4/8/2019 3/19/2019 3/19/2019 2/28/2019   Weight - 257 lb 8 oz - 255 lb 11.2 oz - 260 lb 3.2 oz -   Neck Circ (inches) 18.25 - 18.25 - 18.5 - 19   Waist Measure Inches 49 - 50.25 - 49.5 - 49.5   BMI - 34.92 kg/m2 - 34.68 kg/m2 - 35.29 kg/m2 -         Labs:       Review of Systems  Complete ROS negative except where noted above      Physical Exam    Vital Signs Reviewed  Weight Management Metrics Reviewed    Vital Signs Reviewed  Appearance: Obese, A&O x 3, NAD  HEENT:  NC/AT, EOMI, PERRL, No scleral icterus, malampatti score:  Skin:    Skin tags - no   Acanthosis Nigricans - no  Neck:  No nodes, thyromegaly   Heart:  RRR without M/R/G  Lungs:  CTAB, no rhonchi, rales, or wheezes with good air exchange   Abdomen:  Non-tender, pos bowel sounds; hepatomegaly -   Ext:  No C/C/E        Assessment & Plan  Diagnoses and all orders for this visit:    1. Mixed hyperlipidemia    2. Obesity (BMI 35.0-39.9 without comorbidity)    3. NIKHIL (obstructive sleep apnea)    4. Abnormal liver function test        Based on his history and exam, Zuleima Perez is a good candidate for the  [facility]     Diet:    Activity:    Medication:    There are no Patient Instructions on file for this visit. Follow-up and Dispositions    · Return in about 3 weeks (around 5/20/2019). Over 50% of the 30 minutes face to face time with Luis Felipe Anderson consisted of counseling & coordinating and/or discussing treatment plans in reference to his obesity The primary encounter diagnosis was Mixed hyperlipidemia. Diagnoses of Obesity (BMI 35.0-39.9 without comorbidity), NIKHIL (obstructive sleep apnea), and Abnormal liver function test were also pertinent to this visit.   The patient verbalizes understanding and agrees with the plan of care    Patient has the advanced directives  booklet to review

## 2019-04-29 NOTE — PROGRESS NOTES
1. Have you been to the ER, urgent care clinic since your last visit? Hospitalized since your last visit? No     2. Have you seen or consulted any other health care providers outside of the [address] [place] [address] since your last visit? Include any pap smears or colon screening.  No    Chief Complaint   Patient presents with    Weight Management     2 wk     Body Weight: 257.5  Body Fat%: 32.4  Muscle Mass Weight: 34.9  Body Water Weight: 89.1  Basal Metabolic Rate: 3990  BMI: 34.92

## 2019-05-24 ENCOUNTER — OFFICE VISIT (OUTPATIENT)
Dept: FAMILY MEDICINE CLINIC | Age: 69
End: 2019-05-24

## 2019-05-24 VITALS
HEIGHT: 72 IN | TEMPERATURE: 97.7 F | WEIGHT: 255.6 LBS | RESPIRATION RATE: 18 BRPM | DIASTOLIC BLOOD PRESSURE: 67 MMHG | OXYGEN SATURATION: 95 % | HEART RATE: 55 BPM | BODY MASS INDEX: 34.62 KG/M2 | SYSTOLIC BLOOD PRESSURE: 112 MMHG

## 2019-05-24 DIAGNOSIS — E78.2 MIXED HYPERLIPIDEMIA: Primary | ICD-10-CM

## 2019-05-24 DIAGNOSIS — G47.33 OSA (OBSTRUCTIVE SLEEP APNEA): ICD-10-CM

## 2019-05-24 DIAGNOSIS — R79.89 ABNORMAL LIVER FUNCTION TEST: ICD-10-CM

## 2019-05-24 DIAGNOSIS — E66.9 OBESITY (BMI 35.0-39.9 WITHOUT COMORBIDITY): ICD-10-CM

## 2019-05-24 NOTE — PROGRESS NOTES
Weight Loss Progress Note: follow up Physician Visit      Yolanda Higginbotham is a 77 y.o. male with BMI       who is here for his follow up  Evaluation for the medical bariatric care. CC: I want to be healthier    Weight History  Current weight 255( -2) and BMI is Body mass index is 34.67 kg/m². Goal weight 255( new goal 220)  Highest weight 285   (See weight gain time line scanned into media section of chart)        Significant Medical History    Update on sleep apnea and  CPAP yes    Ever had bariatric surgery: no    Pregnant or planning on becoming pregnant w/in 6 months: no         Significant Psychosocial History   Any history of drug abuse or dependence: no  Current Major Lifestyle Changes: no  Any potential unsupportive people: no          Social History  Social History     Tobacco Use    Smoking status: Former Smoker    Smokeless tobacco: Never Used   Substance Use Topics    Alcohol use: No     How many times a week do you eat out? Do you drink any EtOH?  no   If so, how much? Do you have upcoming any travel in the next 6 weeks?  no   If so, what do you have planned? Exercise  How many days a week do you currently exercise?   5-6 days activity in the yard  Have you ever been told by a physician not to exercise?  no      Objective  Visit Vitals  /67 (BP 1 Location: Left arm, BP Patient Position: Sitting)   Pulse (!) 55   Temp 97.7 °F (36.5 °C) (Oral)   Resp 18   Ht 6' (1.829 m)   Wt 255 lb 9.6 oz (115.9 kg)   SpO2 95%   BMI 34.67 kg/m²       Bicep - (right ) circumference  Waist Circumference: See Weight Management Doc Flowsheet  Neck Circumference: See Weight Management Doc Flowsheet  Percent Body Fat: See Weight Management Doc Flowsheet  Weight Metrics 5/24/2019 5/24/2019 4/29/2019 4/29/2019 4/8/2019 4/8/2019 3/19/2019   Weight - 255 lb 9.6 oz - 257 lb 8 oz - 255 lb 11.2 oz -   Neck Circ (inches) 18 - 18.25 - 18.25 - 18.5   Waist Measure Inches 47 - 49 - 50.25 - 49.5 BMI - 34.67 kg/m2 - 34.92 kg/m2 - 34.68 kg/m2 -         Labs:       Review of Systems  Complete ROS negative except where noted above      Physical Exam    Vital Signs Reviewed  Weight Management Metrics Reviewed    Vital Signs Reviewed  Appearance: Obese, A&O x 3, NAD  HEENT:  NC/AT, EOMI, PERRL, No scleral icterus, malampatti score:  Skin:    Skin tags - no   Acanthosis Nigricans - no  Neck:  No nodes, thyromegaly   Heart:  RRR without M/R/G  Lungs:  CTAB, no rhonchi, rales, or wheezes with good air exchange   Abdomen:  Non-tender, pos bowel sounds; hepatomegaly -   Ext:  No C/C/E        Assessment & Plan  Diagnoses and all orders for this visit:    1. Mixed hyperlipidemia    2. Obesity (BMI 35.0-39.9 without comorbidity)  Diet:    Activity:    Medication:    3. NIKHIL (obstructive sleep apnea)    4. Abnormal liver function test        Based on his history and exam, Kyra Dennis is a good candidate for the  [facility] Program         There are no Patient Instructions on file for this visit. Over 50% of the 30 minutes face to face time with Chris Guerrier consisted of counseling & coordinating and/or discussing treatment plans in reference to his obesity The primary encounter diagnosis was Mixed hyperlipidemia. Diagnoses of Obesity (BMI 35.0-39.9 without comorbidity), NIKHIL (obstructive sleep apnea), and Abnormal liver function test were also pertinent to this visit.   The patient verbalizes understanding and agrees with the plan of care    Patient has the advanced directives  booklet to review

## 2019-05-24 NOTE — PROGRESS NOTES
Marysol Everett is a 77 y.o. male    Chief Complaint   Patient presents with    Weight Loss     3 week follow up       1. Have you been to the ER, urgent care clinic since your last visit? Hospitalized since your last visit? No    2. Have you seen or consulted any other health care providers outside of the [address] [place] [address] since your last visit? Include any pap smears or colon screening.  No    Body Weight: 255.6  Body Fat%: 32.1  Muscle Mass Weight: 34.7  Body Water Weight: 32.5  Basal Metabolic Rate: 4347  BMI: 34.67

## 2019-06-26 ENCOUNTER — OFFICE VISIT (OUTPATIENT)
Dept: FAMILY MEDICINE CLINIC | Age: 69
End: 2019-06-26

## 2019-06-26 VITALS
WEIGHT: 253.2 LBS | DIASTOLIC BLOOD PRESSURE: 76 MMHG | SYSTOLIC BLOOD PRESSURE: 119 MMHG | OXYGEN SATURATION: 97 % | BODY MASS INDEX: 34.29 KG/M2 | TEMPERATURE: 97.5 F | RESPIRATION RATE: 20 BRPM | HEART RATE: 54 BPM | HEIGHT: 72 IN

## 2019-06-26 DIAGNOSIS — R79.89 ABNORMAL LIVER FUNCTION TEST: ICD-10-CM

## 2019-06-26 DIAGNOSIS — G47.33 OSA (OBSTRUCTIVE SLEEP APNEA): ICD-10-CM

## 2019-06-26 DIAGNOSIS — E66.9 OBESITY (BMI 35.0-39.9 WITHOUT COMORBIDITY): ICD-10-CM

## 2019-06-26 DIAGNOSIS — E78.2 MIXED HYPERLIPIDEMIA: Primary | ICD-10-CM

## 2019-06-26 DIAGNOSIS — R73.9 BLOOD GLUCOSE ELEVATED: ICD-10-CM

## 2019-06-26 NOTE — PROGRESS NOTES
1. Have you been to the ER, urgent care clinic since your last visit? Hospitalized since your last visit? No    2. Have you seen or consulted any other health care providers outside of the [address] [place] [address] since your last visit? Include any pap smears or colon screening.  No     Chief Complaint   Patient presents with    Weight Management     2wk     Body Weight: 253.2  Body Fat%: 31.9  Muscle Mass Weight: 34.4  Body Water Weight: 14.9  Basal Metabolic Rate: 2728  BMI: 34.34

## 2019-06-26 NOTE — PROGRESS NOTES
Weight Loss Progress Note: follow up Physician Visit      Loran Apgar is a 77 y.o. male with BMI ,34.34 who is here for his follow up  Evaluation for the medical bariatric care. CC: I want to be healthier    He says he has not been as compliant as he can  He has been eating chips and cookies  He mostly craves junk food after dinner  He has not been taking topamax  His activity is painting the house        Weight History  Current weight 253 and BMI is Body mass index is 34.34 kg/m². Goal weight  220  Highest weight *285  (See weight gain time line scanned into media section of chart)        Significant Medical History    Update on sleep apnea and  CPAP yes 8-9 hours    Ever had bariatric surgery: no    Pregnant or planning on becoming pregnant w/in 6 months: no         Significant Psychosocial History   Any history of drug abuse or dependence: no  Current Major Lifestyle Changes: no  Any potential unsupportive people: no          Social History  Social History     Tobacco Use    Smoking status: Former Smoker    Smokeless tobacco: Never Used   Substance Use Topics    Alcohol use: No     How many times a week do you eat out? 1-2    Do you drink any EtOH?  no   If so, how much? Do you have upcoming any travel in the next 6 weeks?  no   If so, what do you have planned? Exercise  How many days a week do you currently exercise?   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  no      Objective  Visit Vitals  /76   Pulse (!) 54   Temp 97.5 °F (36.4 °C) (Oral)   Resp 20   Ht 6' (1.829 m)   Wt 253 lb 3.2 oz (114.9 kg)   SpO2 97%   BMI 34.34 kg/m²       Bicep - (right ) circumference  Waist Circumference: See Weight Management Doc Flowsheet  Neck Circumference: See Weight Management Doc Flowsheet  Percent Body Fat: See Weight Management Doc Flowsheet  Weight Metrics 2019 Weight - 253 lb 3.2 oz - 255 lb 9.6 oz - 257 lb 8 oz -   Neck Circ (inches) 18.5 - 18 - 18.25 - 18.25   Waist Measure Inches 47.5 - 47 - 49 - 50.25   BMI - 34.34 kg/m2 - 34.67 kg/m2 - 34.92 kg/m2 -         Labs:       Review of Systems  Complete ROS negative except where noted above      Physical Exam    Vital Signs Reviewed  Weight Management Metrics Reviewed    Vital Signs Reviewed  Appearance: Obese, A&O x 3, NAD  HEENT:  NC/AT, EOMI, PERRL, No scleral icterus, malampatti score:  Skin:    Skin tags - no   Acanthosis Nigricans - no  Neck:  No nodes, thyromegaly   Heart:  RRR without M/R/G  Lungs:  CTAB, no rhonchi, rales, or wheezes with good air exchange   Abdomen:  Non-tender, pos bowel sounds; hepatomegaly -   Ext:  No C/C/E        Assessment & Plan  Diagnoses and all orders for this visit:    1. Mixed hyperlipidemia  -     METABOLIC PANEL, COMPREHENSIVE  -     LIPID PANEL    2. Obesity (BMI 35.0-39.9 without comorbidity)  -     METABOLIC PANEL, COMPREHENSIVE  Diet: low carb LCD is working so far    Activity: at 5 days a week now, continue    Medication: no changes    3. NIKHIL (obstructive sleep apnea)  Using cpap  4. Abnormal liver function test  Cont to monitor  5. Blood glucose elevated  -     HEMOGLOBIN A1C WITH EAG    Other orders  -     METABOLIC PANEL, COMPREHENSIVE  -     LIPID PANEL  -     HEMOGLOBIN A1C WITH EAG  -     CVD REPORT        Based on his history and exam, Tal De Souza is a good candidate for the  [facility]         There are no Patient Instructions on file for this visit. Over 50% of the 30 minutes face to face time with Domitila Xie consisted of counseling & coordinating and/or discussing treatment plans in reference to his obesity The primary encounter diagnosis was Mixed hyperlipidemia.  Diagnoses of Obesity (BMI 35.0-39.9 without comorbidity), NIKHIL (obstructive sleep apnea), Abnormal liver function test, and Blood glucose elevated were also pertinent to this visit.  The patient verbalizes understanding and agrees with the plan of care    Patient has the advanced directives  booklet to review

## 2019-06-27 LAB
ALBUMIN SERPL-MCNC: 4.3 G/DL (ref 3.5–4.8)
ALBUMIN/GLOB SERPL: 1.5 {RATIO} (ref 1.2–2.2)
ALP SERPL-CCNC: 121 IU/L (ref 39–117)
ALT SERPL-CCNC: 21 IU/L (ref 0–44)
AST SERPL-CCNC: 13 IU/L (ref 0–40)
BILIRUB SERPL-MCNC: 1 MG/DL (ref 0–1.2)
BUN SERPL-MCNC: 24 MG/DL (ref 8–27)
BUN/CREAT SERPL: 23 (ref 10–24)
CALCIUM SERPL-MCNC: 9.5 MG/DL (ref 8.6–10.2)
CHLORIDE SERPL-SCNC: 104 MMOL/L (ref 96–106)
CHOLEST SERPL-MCNC: 199 MG/DL (ref 100–199)
CO2 SERPL-SCNC: 26 MMOL/L (ref 20–29)
CREAT SERPL-MCNC: 1.06 MG/DL (ref 0.76–1.27)
EST. AVERAGE GLUCOSE BLD GHB EST-MCNC: 108 MG/DL
GLOBULIN SER CALC-MCNC: 2.8 G/DL (ref 1.5–4.5)
GLUCOSE SERPL-MCNC: 98 MG/DL (ref 65–99)
HBA1C MFR BLD: 5.4 % (ref 4.8–5.6)
HDLC SERPL-MCNC: 29 MG/DL
INTERPRETATION, 910389: NORMAL
LDLC SERPL CALC-MCNC: 134 MG/DL (ref 0–99)
POTASSIUM SERPL-SCNC: 4.8 MMOL/L (ref 3.5–5.2)
PROT SERPL-MCNC: 7.1 G/DL (ref 6–8.5)
SODIUM SERPL-SCNC: 141 MMOL/L (ref 134–144)
TRIGL SERPL-MCNC: 178 MG/DL (ref 0–149)
VLDLC SERPL CALC-MCNC: 36 MG/DL (ref 5–40)

## 2019-07-04 NOTE — PROGRESS NOTES
The liver and kidney are normal  The cholesterol is a little higher than the goal but is better than it was 4 months ago. Keep working to eat less junk foods and fried foods.  Also exercise is very helpful for lowering the cholesterol    The blood sugar test is normal

## 2019-07-17 ENCOUNTER — OFFICE VISIT (OUTPATIENT)
Dept: FAMILY MEDICINE CLINIC | Age: 69
End: 2019-07-17

## 2019-07-17 VITALS
RESPIRATION RATE: 20 BRPM | OXYGEN SATURATION: 96 % | TEMPERATURE: 98.4 F | DIASTOLIC BLOOD PRESSURE: 82 MMHG | WEIGHT: 256.7 LBS | HEART RATE: 66 BPM | SYSTOLIC BLOOD PRESSURE: 135 MMHG | HEIGHT: 72 IN | BODY MASS INDEX: 34.77 KG/M2

## 2019-07-17 DIAGNOSIS — R73.9 BLOOD GLUCOSE ELEVATED: ICD-10-CM

## 2019-07-17 DIAGNOSIS — R79.89 ABNORMAL LIVER FUNCTION TEST: ICD-10-CM

## 2019-07-17 DIAGNOSIS — G47.33 OSA (OBSTRUCTIVE SLEEP APNEA): ICD-10-CM

## 2019-07-17 DIAGNOSIS — E78.2 MIXED HYPERLIPIDEMIA: Primary | ICD-10-CM

## 2019-07-17 DIAGNOSIS — E66.9 OBESITY (BMI 35.0-39.9 WITHOUT COMORBIDITY): ICD-10-CM

## 2019-07-17 NOTE — PROGRESS NOTES
1. Have you been to the ER, urgent care clinic since your last visit? Hospitalized since your last visit? No    2. Have you seen or consulted any other health care providers outside of the [address] [place] [address] since your last visit? Include any pap smears or colon screening.  No    Chief Complaint   Patient presents with    Weight Management     2wk     Body Weight: 256.7  Body Fat%: 32.3  Muscle Mass Weight: 34.8  Body Water Weight: 59.9  Basal Metabolic Rate: 1126  BMI: 34.81

## 2019-07-17 NOTE — PROGRESS NOTES
Weight Loss Progress Note: follow up Physician Visit      Danika Peralta is a 77 y.o. male with BMI ,34.81 who is here for his follow up  Evaluation for the medical bariatric care. CC: I want to be healthier    He has restarted eating breads including pizza  He has regained 3 lbs  He has not tried the topamax    Weight History  Current weight 256 and BMI is Body mass index is 34.81 kg/m². Goal weight 255  Highest weight  285  (See weight gain time line scanned into media section of chart)        Significant Medical History    Update on sleep apnea and  CPAP yes    Ever had bariatric surgery: no    Pregnant or planning on becoming pregnant w/in 6 months: no         Significant Psychosocial History   Any history of drug abuse or dependence: no  Current Major Lifestyle Changes: no  Any potential unsupportive people: no          Social History  Social History     Tobacco Use    Smoking status: Former Smoker    Smokeless tobacco: Never Used   Substance Use Topics    Alcohol use: No     How many times a week do you eat out? A lot of cook outs    Do you drink any EtOH?  no   If so, how much? Do you have upcoming any travel in the next 6 weeks?  no   If so, what do you have planned?           Exercise  How many days a week do you currently exercise?  0 days  Have you ever been told by a physician not to exercise?  no      Objective  Visit Vitals  /82   Pulse 66   Temp 98.4 °F (36.9 °C) (Oral)   Resp 20   Ht 6' (1.829 m)   Wt 256 lb 11.2 oz (116.4 kg)   SpO2 96%   BMI 34.81 kg/m²       Bicep - (right ) circumference  Waist Circumference: See Weight Management Doc Flowsheet  Neck Circumference: See Weight Management Doc Flowsheet  Percent Body Fat: See Weight Management Doc Flowsheet  Weight Metrics 7/17/2019 7/17/2019 6/26/2019 6/26/2019 5/24/2019 5/24/2019 4/29/2019   Weight - 256 lb 11.2 oz - 253 lb 3.2 oz - 255 lb 9.6 oz -   Neck Circ (inches) 19 - 18.5 - 18 - 18.25   Waist Measure Inches 48 - 47.5 - 47 - 52   BMI - 34.81 kg/m2 - 34.34 kg/m2 - 34.67 kg/m2 -         Labs:       Review of Systems  Complete ROS negative except where noted above      Physical Exam    Vital Signs Reviewed  Weight Management Metrics Reviewed    Vital Signs Reviewed  Appearance: Obese, A&O x 3, NAD  HEENT:  NC/AT, EOMI, PERRL, No scleral icterus, malampatti score:  Skin:    Skin tags - no   Acanthosis Nigricans - no  Neck:  No nodes, thyromegaly   Heart:  RRR without M/R/G  Lungs:  CTAB, no rhonchi, rales, or wheezes with good air exchange   Abdomen:  Non-tender, pos bowel sounds; hepatomegaly -   Ext:  No C/C/E        Assessment & Plan  Diagnoses and all orders for this visit:    1. Mixed hyperlipidemia    2. Obesity (BMI 35.0-39.9 without comorbidity)  Diet:he is basically at his goal. h has regaind a few pounds  Working on being consistent    Activity: encouraged senior exercise    Medication: no changes    3. NIKHIL (obstructive sleep apnea)  Use cpap daily  4. Abnormal liver function test  Cont to monitor  5. Blood glucose elevated  Continue to monitor, Amber the a1c was normal      Based on his history and exam, Yasmin Chavira is a good candidate for the  [facility] Weight Loss Program         There are no Patient Instructions on file for this visit. Over 50% of the 30 minutes face to face time with Stepan Del Angel consisted of counseling & coordinating and/or discussing treatment plans in reference to his obesity The primary encounter diagnosis was Mixed hyperlipidemia. Diagnoses of Obesity (BMI 35.0-39.9 without comorbidity), NIKHIL (obstructive sleep apnea), Abnormal liver function test, and Blood glucose elevated were also pertinent to this visit.   The patient verbalizes understanding and agrees with the plan of care    Patient has the advanced directives  booklet to review

## 2019-07-31 ENCOUNTER — OFFICE VISIT (OUTPATIENT)
Dept: FAMILY MEDICINE CLINIC | Age: 69
End: 2019-07-31

## 2019-07-31 VITALS
DIASTOLIC BLOOD PRESSURE: 77 MMHG | WEIGHT: 253.7 LBS | OXYGEN SATURATION: 96 % | RESPIRATION RATE: 19 BRPM | HEIGHT: 72 IN | SYSTOLIC BLOOD PRESSURE: 130 MMHG | TEMPERATURE: 97.6 F | BODY MASS INDEX: 34.36 KG/M2 | HEART RATE: 55 BPM

## 2019-07-31 DIAGNOSIS — E78.2 MIXED HYPERLIPIDEMIA: Primary | ICD-10-CM

## 2019-07-31 DIAGNOSIS — G47.33 OSA (OBSTRUCTIVE SLEEP APNEA): ICD-10-CM

## 2019-07-31 DIAGNOSIS — R73.9 BLOOD GLUCOSE ELEVATED: ICD-10-CM

## 2019-07-31 DIAGNOSIS — R79.89 ABNORMAL LIVER FUNCTION TEST: ICD-10-CM

## 2019-07-31 DIAGNOSIS — E66.9 OBESITY (BMI 35.0-39.9 WITHOUT COMORBIDITY): ICD-10-CM

## 2019-07-31 NOTE — PROGRESS NOTES
Weight Loss Progress Note: follow up Physician Visit      Hansel Silva is a 77 y.o. male with BMI , who is here for his follow up  Evaluation for the medical bariatric care. CC: I want to be healthier    He is working on the house which involves a lot of climbing and lifting    She has topamax for appetite     Weight History  Current weight 253 and BMI is Body mass index is 34.41 kg/m². Goal weight 255 met, still working  Highest weight 285  (See weight gain time line scanned into media section of chart)        Significant Medical History    Update on sleep apnea and  CPAP no    Ever had bariatric surgery: no    Pregnant or planning on becoming pregnant w/in 6 months: no         Significant Psychosocial History   Any history of drug abuse or dependence: no  Current Major Lifestyle Changes: no  Any potential unsupportive people: no          Social History  Social History     Tobacco Use    Smoking status: Former Smoker    Smokeless tobacco: Never Used   Substance Use Topics    Alcohol use: No     How many times a week do you eat out?  0    Do you drink any EtOH?  no   If so, how much? Do you have upcoming any travel in the next 6 weeks?  no   If so, what do you have planned? Exercise  How many days a week do you currently exercise?   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  no      Objective  Visit Vitals  /77   Pulse (!) 55   Temp 97.6 °F (36.4 °C) (Oral)   Resp 19   Ht 6' (1.829 m)   Wt 253 lb 11.2 oz (115.1 kg)   SpO2 96%   BMI 34.41 kg/m²       Bicep - (right ) circumference  Waist Circumference: See Weight Management Doc Flowsheet  Neck Circumference: See Weight Management Doc Flowsheet  Percent Body Fat: See Weight Management Doc Flowsheet  Weight Metrics 7/31/2019 7/31/2019 7/17/2019 7/17/2019 6/26/2019 6/26/2019 5/24/2019   Weight - 253 lb 11.2 oz - 256 lb 11.2 oz - 253 lb 3.2 oz -   Neck Circ (inches) 18.5 - 19 - 18.5 - 18   Waist Measure Inches 49 - 48 - 47.5 - 47   BMI - 34.41 kg/m2 - 34.81 kg/m2 - 34.34 kg/m2 -         Labs:       Review of Systems  Complete ROS negative except where noted above      Physical Exam    Vital Signs Reviewed  Weight Management Metrics Reviewed    Vital Signs Reviewed  Appearance: Obese, A&O x 3, NAD  HEENT:  NC/AT, EOMI, PERRL, No scleral icterus, malampatti score:  Skin:    Skin tags - no   Acanthosis Nigricans -   Neck:  No nodes, thyromegaly   Heart:  RRR without M/R/G  Lungs:  CTAB, no rhonchi, rales, or wheezes with good air exchange   Abdomen:  Non-tender, pos bowel sounds; hepatomegaly -   Ext:  No C/C/E        Assessment & Plan  Diagnoses and all orders for this visit:    1. Mixed hyperlipidemia    2. Obesity (BMI 35.0-39.9 without comorbidity)    3. NIKHIL (obstructive sleep apnea)    4. Abnormal liver function test    5. Blood glucose elevated        Based on his history and exam, Carlos Villa is a good candidate for the  [facility] Program     Diet:    Activity:    Medication:    There are no Patient Instructions on file for this visit. Over 50% of the 30 minutes face to face time with Fish Miranda consisted of counseling & coordinating and/or discussing treatment plans in reference to his obesity The primary encounter diagnosis was Mixed hyperlipidemia. Diagnoses of Obesity (BMI 35.0-39.9 without comorbidity), NIKHIL (obstructive sleep apnea), Abnormal liver function test, and Blood glucose elevated were also pertinent to this visit.   The patient verbalizes understanding and agrees with the plan of care    Patient has the advanced directives  booklet to review

## 2019-07-31 NOTE — PROGRESS NOTES
1. Have you been to the ER, urgent care clinic since your last visit? Hospitalized since your last visit? No    2. Have you seen or consulted any other health care providers outside of the [address] [place] [address] since your last visit? Include any pap smears or colon screening.  No     Chief Complaint   Patient presents with    Weight Management     Body Weight: 253.7  Body Fat%: 31.9  Muscle Mass Weight: 34.4  Body Water Weight: 04.7  Basal Metabolic Rate: 5845  BMI: 34.41

## 2021-09-10 ENCOUNTER — HOSPITAL ENCOUNTER (EMERGENCY)
Age: 71
Discharge: HOME | End: 2021-09-10
Attending: EMERGENCY MEDICINE
Payer: MEDICARE

## 2021-09-10 ENCOUNTER — APPOINTMENT (OUTPATIENT)
Dept: CT IMAGING | Age: 71
End: 2021-09-10
Attending: EMERGENCY MEDICINE
Payer: MEDICARE

## 2021-09-10 VITALS
HEIGHT: 71 IN | DIASTOLIC BLOOD PRESSURE: 81 MMHG | BODY MASS INDEX: 38.92 KG/M2 | SYSTOLIC BLOOD PRESSURE: 119 MMHG | TEMPERATURE: 97.9 F | WEIGHT: 278 LBS | HEART RATE: 75 BPM | OXYGEN SATURATION: 98 % | RESPIRATION RATE: 18 BRPM

## 2021-09-10 DIAGNOSIS — R10.9 ACUTE ABDOMINAL PAIN: Primary | ICD-10-CM

## 2021-09-10 DIAGNOSIS — K59.00 ACUTE CONSTIPATION: ICD-10-CM

## 2021-09-10 DIAGNOSIS — R11.0 NAUSEA WITHOUT VOMITING: ICD-10-CM

## 2021-09-10 LAB
ALBUMIN SERPL-MCNC: 3.5 G/DL (ref 3.5–5)
ALBUMIN/GLOB SERPL: 0.8 {RATIO} (ref 1.1–2.2)
ALP SERPL-CCNC: 268 U/L (ref 45–117)
ALT SERPL-CCNC: 718 U/L (ref 12–78)
ANION GAP SERPL CALC-SCNC: 6 MMOL/L (ref 5–15)
APPEARANCE UR: CLEAR
AST SERPL-CCNC: 420 U/L (ref 15–37)
BACTERIA URNS QL MICRO: NEGATIVE /HPF
BASOPHILS # BLD: 0.1 K/UL (ref 0–0.1)
BASOPHILS NFR BLD: 1 % (ref 0–1)
BILIRUB SERPL-MCNC: 2.5 MG/DL (ref 0.2–1)
BILIRUB UR QL CFM: NEGATIVE
BUN SERPL-MCNC: 22 MG/DL (ref 6–20)
BUN/CREAT SERPL: 19 (ref 12–20)
CALCIUM SERPL-MCNC: 9 MG/DL (ref 8.5–10.1)
CHLORIDE SERPL-SCNC: 105 MMOL/L (ref 97–108)
CO2 SERPL-SCNC: 27 MMOL/L (ref 21–32)
COLOR UR: ABNORMAL
CREAT SERPL-MCNC: 1.14 MG/DL (ref 0.7–1.3)
DIFFERENTIAL METHOD BLD: ABNORMAL
EOSINOPHIL # BLD: 0.2 K/UL (ref 0–0.4)
EOSINOPHIL NFR BLD: 3 % (ref 0–7)
EPITH CASTS URNS QL MICRO: NORMAL /LPF
ERYTHROCYTE [DISTWIDTH] IN BLOOD BY AUTOMATED COUNT: 15.1 % (ref 11.5–14.5)
GLOBULIN SER CALC-MCNC: 4.4 G/DL (ref 2–4)
GLUCOSE SERPL-MCNC: 169 MG/DL (ref 65–100)
GLUCOSE UR STRIP.AUTO-MCNC: NEGATIVE MG/DL
HCT VFR BLD AUTO: 45.1 % (ref 36.6–50.3)
HGB BLD-MCNC: 14.9 G/DL (ref 12.1–17)
HGB UR QL STRIP: NEGATIVE
HYALINE CASTS URNS QL MICRO: NORMAL /LPF (ref 0–5)
IMM GRANULOCYTES # BLD AUTO: 0 K/UL (ref 0–0.04)
IMM GRANULOCYTES NFR BLD AUTO: 1 % (ref 0–0.5)
KETONES UR QL STRIP.AUTO: NEGATIVE MG/DL
LEUKOCYTE ESTERASE UR QL STRIP.AUTO: NEGATIVE
LIPASE SERPL-CCNC: 68 U/L (ref 73–393)
LYMPHOCYTES # BLD: 0.8 K/UL (ref 0.8–3.5)
LYMPHOCYTES NFR BLD: 15 % (ref 12–49)
MCH RBC QN AUTO: 29.2 PG (ref 26–34)
MCHC RBC AUTO-ENTMCNC: 33 G/DL (ref 30–36.5)
MCV RBC AUTO: 88.3 FL (ref 80–99)
MONOCYTES # BLD: 0.5 K/UL (ref 0–1)
MONOCYTES NFR BLD: 9 % (ref 5–13)
NEUTS SEG # BLD: 4.1 K/UL (ref 1.8–8)
NEUTS SEG NFR BLD: 71 % (ref 32–75)
NITRITE UR QL STRIP.AUTO: NEGATIVE
NRBC # BLD: 0 K/UL (ref 0–0.01)
NRBC BLD-RTO: 0 PER 100 WBC
PH UR STRIP: 6 [PH] (ref 5–8)
PLATELET # BLD AUTO: 195 K/UL (ref 150–400)
PMV BLD AUTO: 10.5 FL (ref 8.9–12.9)
POTASSIUM SERPL-SCNC: 3.9 MMOL/L (ref 3.5–5.1)
PROT SERPL-MCNC: 7.9 G/DL (ref 6.4–8.2)
PROT UR STRIP-MCNC: 30 MG/DL
RBC # BLD AUTO: 5.11 M/UL (ref 4.1–5.7)
RBC #/AREA URNS HPF: NORMAL /HPF (ref 0–5)
SODIUM SERPL-SCNC: 138 MMOL/L (ref 136–145)
SP GR UR REFRACTOMETRY: 1.02 (ref 1–1.03)
UROBILINOGEN UR QL STRIP.AUTO: >8 EU/DL (ref 0.2–1)
WBC # BLD AUTO: 5.7 K/UL (ref 4.1–11.1)
WBC URNS QL MICRO: NORMAL /HPF (ref 0–4)

## 2021-09-10 PROCEDURE — 74011250636 HC RX REV CODE- 250/636: Performed by: EMERGENCY MEDICINE

## 2021-09-10 PROCEDURE — 99283 EMERGENCY DEPT VISIT LOW MDM: CPT

## 2021-09-10 PROCEDURE — 36415 COLL VENOUS BLD VENIPUNCTURE: CPT

## 2021-09-10 PROCEDURE — 80053 COMPREHEN METABOLIC PANEL: CPT

## 2021-09-10 PROCEDURE — 74177 CT ABD & PELVIS W/CONTRAST: CPT

## 2021-09-10 PROCEDURE — 83690 ASSAY OF LIPASE: CPT

## 2021-09-10 PROCEDURE — 81001 URINALYSIS AUTO W/SCOPE: CPT

## 2021-09-10 PROCEDURE — 74011000636 HC RX REV CODE- 636: Performed by: EMERGENCY MEDICINE

## 2021-09-10 PROCEDURE — 85025 COMPLETE CBC W/AUTO DIFF WBC: CPT

## 2021-09-10 RX ORDER — ATORVASTATIN CALCIUM 40 MG/1
40 TABLET, FILM COATED ORAL DAILY
COMMUNITY

## 2021-09-10 RX ADMIN — IOPAMIDOL 100 ML: 755 INJECTION, SOLUTION INTRAVENOUS at 07:17

## 2021-09-10 RX ADMIN — SODIUM CHLORIDE 1000 ML: 9 INJECTION, SOLUTION INTRAVENOUS at 07:27

## 2021-09-10 NOTE — ED NOTES
Bedside and Verbal shift change report given to [phone] Erasto Poole RN (oncoming nurse) by Bhupendra Valerio RN (offgoing nurse). Report included the following information SBAR, ED Summary, MAR and Recent Results.

## 2021-09-10 NOTE — ED PROVIDER NOTES
EMERGENCY DEPARTMENT HISTORY AND PHYSICAL EXAM      Date: 9/10/2021  Patient Name: Rick Reyna    History of Presenting Illness     Chief Complaint   Patient presents with    Abdominal Pain     Patient arrives with complaint of upper abdominal pain for 4 days intermittently. Gallbladder removed in June. Patient has nausea and constipation. History Provided By: Patient and Patient's Wife    HPI: Rick Reyna, 65 y.o. male presents to the ED with history of cholecystectomy, prior bowel resection for nonmalignant mass, currently being worked up for [employer] on my kidney\", here with no BM x5 days. Patient had onset of abdominal pain on day 2, passing some gas, has nausea but no vomiting. He reports a history were surgeon told him made a lot of scar tissue that made it difficult to get his gallbladder out last time. While he was in the waiting room he had nausea with 6 out of 10 pain but after the nausea past he no longer had any abdominal pain. He currently denies any pain or any nausea and has no chest pain or shortness of breath. He is able to urinate normally and has historically not had trouble with constipation. He has been eating and drinking less over the last few days given his constipation problem. There are no other complaints, changes, or physical findings at this time. PCP: Haley Hale MD    No current facility-administered medications on file prior to encounter. Current Outpatient Medications on File Prior to Encounter   Medication Sig Dispense Refill    atorvastatin (Lipitor) 40 mg tablet Take 40 mg by mouth daily.  pantoprazole (PROTONIX) 40 mg tablet Take 40 mg by mouth daily.  [DISCONTINUED] topiramate (TOPAMAX) 25 mg tablet Take 1 Tab by mouth daily (with dinner). 30 Tab 6    [DISCONTINUED] CHOLECALCIFEROL, VITAMIN D3, (VITAMIN D3 PO) Take  by mouth daily.  [DISCONTINUED] meloxicam (MOBIC) 15 mg tablet Take 15 mg by mouth daily.       [DISCONTINUED] atorvastatin (LIPITOR) 20 mg tablet Take 20 mg by mouth daily.  [DISCONTINUED] aspirin delayed-release 81 mg tablet Take 162 mg by mouth daily.  [DISCONTINUED] ranitidine (ZANTAC) 300 mg tablet Take 300 mg by mouth daily. Past History     Past Medical History:  Past Medical History:   Diagnosis Date    CAD (coronary artery disease) 2014    h/o MI with stent    Cancer ([facility] [address] 2007    prostate    Dyspepsia and other specified disorders of function of stomach     Joint pain     Musculoskeletal disorder     Sleep apnea     not using CPAP at this time    SOB (shortness of breath)        Past Surgical History:  Past Surgical History:   Procedure Laterality Date    HX GI  2007    remove mass of colon    HX PROSTATECTOMY  2007    seed implantation       Family History:  Family History   Problem Relation Age of Onset    Cancer Father         father       Social History:  Social History     Tobacco Use    Smoking status: Former Smoker    Smokeless tobacco: Never Used   Substance Use Topics    Alcohol use: No    Drug use: No       Allergies: Allergies   Allergen Reactions    Crestor [Rosuvastatin] Shortness of Breath    Niaspan [Niacin] Hives and Shortness of Breath    Percocet [Oxycodone-Acetaminophen] Rash         Review of Systems   Review of Systems   Constitutional: Positive for appetite change and chills. Negative for diaphoresis, fatigue, fever and unexpected weight change. HENT: Negative for congestion and voice change. Respiratory: Negative for chest tightness and shortness of breath. Cardiovascular: Negative for chest pain. Gastrointestinal: Positive for abdominal pain, constipation and nausea. Negative for blood in stool, diarrhea, rectal pain and vomiting. Genitourinary: Negative for difficulty urinating. Musculoskeletal: Negative for back pain. Recent knee injections for arthritis. Neurological: Negative for syncope.    All other systems reviewed and are negative. Physical Exam   Physical Exam   Vital signs and nursing notes reviewed    CONSTITUTIONAL: Alert, in mild distress; well-developed; well-nourished. BMI greater than 30. HEAD:  Normocephalic, atraumatic  EYES: PERRL; EOM's intact. Nonicteric sclera. ENTM: Nose: no rhinorrhea; Throat: no erythema or exudate, mucous membranes dry. Neck:  Supple. trachea is midline. RESP: Chest clear, equal breath sounds. - W/R/R  CV: S1 and S2 WNL; No murmurs, gallops or rubs. 2+ radial and DP pulses bilaterally. GI: Mild distention, soft with generalized abdominal tenderness without rebound or guarding. Diagonal scar in the right mid abdomen without hernia. . No masses or organomegaly. : No costo-vertebral angle tenderness. BACK:  Non-tender, normal appearance  UPPER EXT:  Normal inspection. no joint or soft tissue swelling  LOWER EXT: No edema, no calf tenderness. NEURO: Alert and oriented x3, 5/5 strength and light touch sensation intact in bilateral upper and lower extremities. SKIN: No rashes; Warm and dry  PSYCH: Normal mood, normal affect    Diagnostic Study Results     Labs -     Recent Results (from the past 12 hour(s))   CBC WITH AUTOMATED DIFF    Collection Time: 09/10/21  6:16 AM   Result Value Ref Range    WBC 5.7 4.1 - 11.1 K/uL    RBC 5.11 4.10 - 5.70 M/uL    HGB 14.9 12.1 - 17.0 g/dL    HCT 45.1 36.6 - 50.3 %    MCV 88.3 80.0 - 99.0 FL    MCH 29.2 26.0 - 34.0 PG    MCHC 33.0 30.0 - 36.5 g/dL    RDW 15.1 (H) 11.5 - 14.5 %    PLATELET 592 865 - 657 K/uL    MPV 10.5 8.9 - 12.9 FL    NRBC 0.0 0  WBC    ABSOLUTE NRBC 0.00 0.00 - 0.01 K/uL    NEUTROPHILS 71 32 - 75 %    LYMPHOCYTES 15 12 - 49 %    MONOCYTES 9 5 - 13 %    EOSINOPHILS 3 0 - 7 %    BASOPHILS 1 0 - 1 %    IMMATURE GRANULOCYTES 1 (H) 0.0 - 0.5 %    ABS. NEUTROPHILS 4.1 1.8 - 8.0 K/UL    ABS. LYMPHOCYTES 0.8 0.8 - 3.5 K/UL    ABS. MONOCYTES 0.5 0.0 - 1.0 K/UL    ABS. EOSINOPHILS 0.2 0.0 - 0.4 K/UL    ABS. BASOPHILS 0.1 0.0 - 0.1 K/UL    ABS. IMM. GRANS. 0.0 0.00 - 0.04 K/UL    DF AUTOMATED     METABOLIC PANEL, COMPREHENSIVE    Collection Time: 09/10/21  6:16 AM   Result Value Ref Range    Sodium 138 136 - 145 mmol/L    Potassium 3.9 3.5 - 5.1 mmol/L    Chloride 105 97 - 108 mmol/L    CO2 27 21 - 32 mmol/L    Anion gap 6 5 - 15 mmol/L    Glucose 169 (H) 65 - 100 mg/dL    BUN 22 (H) 6 - 20 MG/DL    Creatinine 1.14 0.70 - 1.30 MG/DL    BUN/Creatinine ratio 19 12 - 20      GFR est AA >60 >60 ml/min/1.73m2    GFR est non-AA >60 >60 ml/min/1.73m2    Calcium 9.0 8.5 - 10.1 MG/DL    Bilirubin, total 2.5 (H) 0.2 - 1.0 MG/DL    ALT (SGPT) 718 (H) 12 - 78 U/L    AST (SGOT) 420 (H) 15 - 37 U/L    Alk. phosphatase 268 (H) 45 - 117 U/L    Protein, total 7.9 6.4 - 8.2 g/dL    Albumin 3.5 3.5 - 5.0 g/dL    Globulin 4.4 (H) 2.0 - 4.0 g/dL    A-G Ratio 0.8 (L) 1.1 - 2.2     URINALYSIS W/ RFLX MICROSCOPIC    Collection Time: 09/10/21  6:16 AM   Result Value Ref Range    Color YELLOW/STRAW      Appearance CLEAR CLEAR      Specific gravity 1.025 1.003 - 1.030      pH (UA) 6.0 5.0 - 8.0      Protein 30 (A) NEG mg/dL    Glucose Negative NEG mg/dL    Ketone Negative NEG mg/dL    Blood Negative NEG      Urobilinogen >8.0 (H) 0.2 - 1.0 EU/dL    Nitrites Negative NEG      Leukocyte Esterase Negative NEG     LIPASE    Collection Time: 09/10/21  6:16 AM   Result Value Ref Range    Lipase 68 (L) 73 - 393 U/L   BILIRUBIN, CONFIRM    Collection Time: 09/10/21  6:16 AM   Result Value Ref Range    Bilirubin UA, confirm Negative     URINE MICROSCOPIC ONLY    Collection Time: 09/10/21  6:16 AM   Result Value Ref Range    WBC 0-4 0 - 4 /hpf    RBC 0-5 0 - 5 /hpf    Epithelial cells FEW FEW /lpf    Bacteria Negative NEG /hpf    Hyaline cast 0-2 0 - 5 /lpf       Radiologic Studies -   CT ABD PELV W CONT   Final Result   Thickening and slight enhancement involving the distal common bile duct/ampulla   is concerning for underlying inflammatory change. Correlation with MR is   recommended. Bilateral hyperdense cysts are likely related to hemorrhagic cysts. These can also be further evaluated by MR. Hepatic steatosis. CT Results  (Last 48 hours)               09/10/21 0717  CT ABD PELV W CONT Final result    Impression: Thickening and slight enhancement involving the distal common bile duct/ampulla   is concerning for underlying inflammatory change. Correlation with MR is   recommended. Bilateral hyperdense cysts are likely related to hemorrhagic cysts. These can also be further evaluated by MR. Hepatic steatosis. Narrative:  EXAM: CT ABD PELV W CONT       INDICATION: No BM x5 days with abdominal pain, history of abdominal surgeries,   eval for obstruction. COMPARISON: None        CONTRAST: 100 mL of Isovue-370. TECHNIQUE:    Following the uneventful intravenous administration of contrast, thin axial   images were obtained through the abdomen and pelvis. Coronal and sagittal   reconstructions were generated. Oral contrast was not administered. CT dose   reduction was achieved through use of a standardized protocol tailored for this   examination and automatic exposure control for dose modulation. FINDINGS:    LOWER THORAX: No significant abnormality in the incidentally imaged lower chest.   LIVER: Hepatic steatosis. BILIARY TREE: The gallbladder is surgically absent. There is thickening and   slight enhancement involving the distal common bile duct/ampulla. SPLEEN: 15 mm hemangioma. PANCREAS: No mass or ductal dilatation. ADRENALS: Unremarkable. KIDNEYS: Slightly hyperdense bilateral renal lesions are noted, the largest of   which measures 2.4 cm on the right. These may represent hemorrhagic cysts. No   hydronephrosis. STOMACH: Unremarkable. SMALL BOWEL: No dilatation or wall thickening. COLON: No dilatation or wall thickening. Status post partial colectomy. APPENDIX: Surgically absent.    PERITONEUM: No ascites or pneumoperitoneum. RETROPERITONEUM: No lymphadenopathy or aortic aneurysm. REPRODUCTIVE ORGANS: Prostate seed implants are noted. URINARY BLADDER: No mass or calculus. BONES: No destructive bone lesion. ABDOMINAL WALL: No mass or hernia. ADDITIONAL COMMENTS: N/A               CXR Results  (Last 48 hours)    None          Medical Decision Making   I am the first provider for this patient. I reviewed the vital signs, available nursing notes, past medical history, past surgical history, family history and social history. Vital Signs-Reviewed the patient's vital signs. Patient Vitals for the past 12 hrs:   Temp Pulse Resp BP SpO2   09/10/21 0608 97.9 °F (36.6 °C) 61 18 (!) 156/83 96 %         Records Reviewed: Nursing Notes and Old Medical Records    Provider Notes (Medical Decision Making):   38-year-old male here with no BM x5 days, intermittent pain and nausea. Differential includes constipation, partial bowel obstruction, complete bowel obstruction, gastritis, duodenitis, colitis, mass. Plan for IV hydration, pain and nausea control as needed, CT abdomen pelvis with contrast to evaluate for obstruction, mass, inflammation or infection. Patient previously had bowel resection with Dr. Aide Guardado. ED Course:   Initial assessment performed. The patients presenting problems have been discussed, and they are in agreement with the care plan formulated and outlined with them. I have encouraged them to ask questions as they arise throughout their visit. ED Course as of Sep 10 0940   Fri Sep 10, 2021   9771 Updated patient on CT results, plan for soapsuds enema. We did discuss renal cyst and patient has follow-up with Dr. Briseyda Wu today at 6 AM for this. Also discussed bile duct and ampulla changes and patient has a GI doctor that he can follow-up with about that. [TL]   [id number] Very large BM after soapsuds enema. Patient feeling better, plan for discharge.     [TL]      ED Course User Index  [TL] Manny Lemons MD           Disposition:  Discharge    Discharge Note:  9:37 AM  The pt is ready for discharge. The pt's signs, symptoms, diagnosis, and discharge instructions have been discussed and pt has conveyed their understanding. The pt is to follow up as recommended or return to ER should their symptoms worsen. Plan has been discussed and pt is in agreement. DISCHARGE PLAN:  1. Current Discharge Medication List        2. Follow-up Information     Follow up With Specialties Details Why Contact Info    Sushant Honeycutt MD Family Medicine  Please call your primary care doctor or GI physician for follow-up about the ampulla changes seen on CT. Please bring your CT report to your follow-up visit so that they can refer you accordingly for possible additional imaging. [address] [facility]  [phone]      [facility] EMERGENCY DEPT Emergency Medicine  If symptoms worsen including fever, new abdominal pain, inability urinate, blood in your stool or other new concerning symptoms. [address] [place] [address]  [phone]        3. Return to ED if worse     Diagnosis     Clinical Impression:   1. Acute abdominal pain    2. Nausea without vomiting    3. Acute constipation        Attestations:    Desiree Reveles MD    Please note that this dictation was completed with [employer], the computer voice recognition software. Quite often unanticipated grammatical, syntax, homophones, and other interpretive errors are inadvertently transcribed by the computer software. Please disregard these errors. Please excuse any errors that have escaped final proofreading. Thank you.

## 2021-09-10 NOTE — ED NOTES
Pt presents to the ED for abdominal pain that started on Tuesday. Pt stated he also hasn't had a bowel movement since Sunday. Pt states he is not currently in pain at this time. Pt stated that he had his gallbladder out in June. Pt stated nausea with the pain.

## 2021-09-10 NOTE — ED NOTES
0720:Bedside and Verbal shift change report by Kemi Moreira RN (offgoing nurse) to Jeffery Pinedo (oncoming nurse). Report included the following information SBAR, ED Summary, MAR and Recent Results    0913: Enema started at this time    0945: I have reviewed discharge instructions with the patient. The patient verbalized understanding.  Patient has no questions at this time

## 2022-03-19 PROBLEM — E66.01 SEVERE OBESITY (HCC): Status: ACTIVE | Noted: 2018-10-24

## 2022-04-18 ENCOUNTER — TRANSCRIBE ORDER (OUTPATIENT)
Dept: SCHEDULING | Age: 72
End: 2022-04-18

## 2022-04-18 DIAGNOSIS — I71.9 AORTA ANEURYSM (HCC): Primary | ICD-10-CM

## 2022-10-04 ENCOUNTER — HOSPITAL ENCOUNTER (OUTPATIENT)
Dept: CT IMAGING | Age: 72
Discharge: HOME | End: 2022-10-04
Attending: SURGERY
Payer: MEDICARE

## 2022-10-04 ENCOUNTER — HOSPITAL ENCOUNTER (OUTPATIENT)
Dept: CT IMAGING | Age: 72
End: 2022-10-04
Attending: SURGERY
Payer: MEDICARE

## 2022-10-04 DIAGNOSIS — I71.9 AORTA ANEURYSM (HCC): ICD-10-CM

## 2022-10-04 LAB — CREAT BLD-MCNC: 1.1 MG/DL (ref 0.6–1.3)

## 2022-10-04 PROCEDURE — 82565 ASSAY OF CREATININE: CPT

## 2022-10-04 PROCEDURE — 74011000636 HC RX REV CODE- 636: Performed by: SURGERY

## 2022-10-04 PROCEDURE — 74174 CTA ABD&PLVS W/CONTRAST: CPT

## 2022-10-04 RX ADMIN — IOPAMIDOL 100 ML: 755 INJECTION, SOLUTION INTRAVENOUS at 13:18

## 2024-06-04 PROBLEM — M17.11 OSTEOARTHRITIS OF RIGHT KNEE: Status: ACTIVE | Noted: 2024-06-04

## 2024-08-08 ENCOUNTER — HOSPITAL ENCOUNTER (OUTPATIENT)
Facility: HOSPITAL | Age: 74
Discharge: HOME | End: 2024-08-08
Payer: MEDICARE

## 2024-08-08 VITALS
TEMPERATURE: 97.7 F | SYSTOLIC BLOOD PRESSURE: 168 MMHG | BODY MASS INDEX: 38.75 KG/M2 | WEIGHT: 276.8 LBS | HEART RATE: 70 BPM | OXYGEN SATURATION: 97 % | HEIGHT: 71 IN | DIASTOLIC BLOOD PRESSURE: 81 MMHG

## 2024-08-08 LAB
ABO + RH BLD: NORMAL
APPEARANCE UR: CLEAR
BACTERIA URNS QL MICRO: NEGATIVE /HPF
BILIRUB UR QL: NEGATIVE
BLOOD GROUP ANTIBODIES SERPL: NORMAL
COLOR UR: NORMAL
EPITH CASTS URNS QL MICRO: NORMAL /LPF
GLUCOSE UR STRIP.AUTO-MCNC: NEGATIVE MG/DL
HGB UR QL STRIP: NEGATIVE
HYALINE CASTS URNS QL MICRO: NORMAL /LPF (ref 0–5)
INR PPP: 1 (ref 0.9–1.1)
KETONES UR QL STRIP.AUTO: NEGATIVE MG/DL
LEUKOCYTE ESTERASE UR QL STRIP.AUTO: NEGATIVE
NITRITE UR QL STRIP.AUTO: NEGATIVE
PH UR STRIP: 6 (ref 5–8)
PROT UR STRIP-MCNC: NEGATIVE MG/DL
PROTHROMBIN TIME: 10.7 SEC (ref 9–11.1)
RBC #/AREA URNS HPF: NORMAL /HPF (ref 0–5)
SP GR UR REFRACTOMETRY: 1.01 (ref 1–1.03)
SPECIMEN EXP DATE BLD: NORMAL
URINE CULTURE IF INDICATED: NORMAL
UROBILINOGEN UR QL STRIP.AUTO: 0.2 EU/DL (ref 0.2–1)
WBC URNS QL MICRO: NORMAL /HPF (ref 0–4)

## 2024-08-08 PROCEDURE — 85610 PROTHROMBIN TIME: CPT

## 2024-08-08 PROCEDURE — 86850 RBC ANTIBODY SCREEN: CPT

## 2024-08-08 PROCEDURE — 81001 URINALYSIS AUTO W/SCOPE: CPT

## 2024-08-08 PROCEDURE — 86900 BLOOD TYPING SEROLOGIC ABO: CPT

## 2024-08-08 PROCEDURE — 36415 COLL VENOUS BLD VENIPUNCTURE: CPT

## 2024-08-08 PROCEDURE — 86901 BLOOD TYPING SEROLOGIC RH(D): CPT

## 2024-08-08 RX ORDER — MONTELUKAST SODIUM 10 MG/1
10 TABLET ORAL NIGHTLY
COMMUNITY

## 2024-08-08 RX ORDER — SEMAGLUTIDE 0.68 MG/ML
0.5 INJECTION, SOLUTION SUBCUTANEOUS
COMMUNITY

## 2024-08-08 RX ORDER — LOSARTAN POTASSIUM 25 MG/1
25 TABLET ORAL DAILY
COMMUNITY

## 2024-08-08 RX ORDER — LANOLIN ALCOHOL/MO/W.PET/CERES
1000 CREAM (GRAM) TOPICAL DAILY
COMMUNITY

## 2024-08-08 ASSESSMENT — KOOS JR
GOING UP OR DOWN STAIRS: SEVERE
BENDING TO THE FLOOR TO PICK UP OBJECT: SEVERE
KOOS JR TOTAL INTERVAL SCORE: 65.994
STANDING UPRIGHT: MODERATE

## 2024-08-08 ASSESSMENT — PROMIS GLOBAL HEALTH SCALE
IN THE PAST 7 DAYS, HOW WOULD YOU RATE YOUR PAIN ON AVERAGE [ON A SCALE FROM 0 (NO PAIN) TO 10 (WORST IMAGINABLE PAIN)]?: 8
IN GENERAL, HOW WOULD YOU RATE YOUR MENTAL HEALTH, INCLUDING YOUR MOOD AND YOUR ABILITY TO THINK [ON A SCALE OF 1 (POOR) TO 5 (EXCELLENT)]?: GOOD
IN THE PAST 7 DAYS, HOW WOULD YOU RATE YOUR FATIGUE ON AVERAGE [ON A SCALE FROM 1 (NONE) TO 5 (VERY SEVERE)]?: MILD
WHO IS THE PERSON COMPLETING THE PROMIS V1.1 SURVEY?: SELF
SUM OF RESPONSES TO QUESTIONS 3, 6, 7, & 8: 17
IN GENERAL, WOULD YOU SAY YOUR HEALTH IS...[ON A SCALE OF 1 (POOR) TO 5 (EXCELLENT)]: GOOD
SUM OF RESPONSES TO QUESTIONS 2, 4, 5, & 10: 14
IN GENERAL, PLEASE RATE HOW WELL YOU CARRY OUT YOUR USUAL SOCIAL ACTIVITIES (INCLUDES ACTIVITIES AT HOME, AT WORK, AND IN YOUR COMMUNITY, AND RESPONSIBILITIES AS A PARENT, CHILD, SPOUSE, EMPLOYEE, FRIEND, ETC) [ON A SCALE OF 1 (POOR) TO 5 (EXCELLENT)]?: POOR
IN GENERAL, WOULD YOU SAY YOUR QUALITY OF LIFE IS...[ON A SCALE OF 1 (POOR) TO 5 (EXCELLENT)]: GOOD
TO WHAT EXTENT ARE YOU ABLE TO CARRY OUT YOUR EVERYDAY PHYSICAL ACTIVITIES SUCH AS WALKING, CLIMBING STAIRS, CARRYING GROCERIES, OR MOVING A CHAIR [ON A SCALE OF 1 (NOT AT ALL) TO 5 (COMPLETELY)]?: A LITTLE
HOW IS THE PROMIS V1.1 BEING ADMINISTERED?: PAPER
IN THE PAST 7 DAYS, HOW OFTEN HAVE YOU BEEN BOTHERED BY EMOTIONAL PROBLEMS, SUCH AS FEELING ANXIOUS, DEPRESSED, OR IRRITABLE [ON A SCALE FROM 1 (NEVER) TO 5 (ALWAYS)]?: NEVER
IN GENERAL, HOW WOULD YOU RATE YOUR PHYSICAL HEALTH [ON A SCALE OF 1 (POOR) TO 5 (EXCELLENT)]?: GOOD
IN GENERAL, HOW WOULD YOU RATE YOUR SATISFACTION WITH YOUR SOCIAL ACTIVITIES AND RELATIONSHIPS [ON A SCALE OF 1 (POOR) TO 5 (EXCELLENT)]?: GOOD

## 2024-08-08 NOTE — PERIOP NOTE
[address] [address]   MAIN OR                                  [phone]    MAIN PRE OP             [phone]                                                                                AMBULATORY PRE OP          [phone]  PRE-ADMISSION TESTING    [phone]     Surgery Date:  8/22/24       Is surgery arrival time given by surgeon?  YES     If “NO”, [facility] staff will call you between 4 and 7pm the day before your surgery with your arrival time. (If your surgery is on a Monday, we will call you the Friday before.)    Call [phone] after 7pm Monday-Friday if you did not receive this call.    INSTRUCTIONS BEFORE YOUR SURGERY   When You  Arrive Arrive at [facility] [place] [facility] on 1st floor the day of your surgery.   Medications to   TAKE   Morning of Surgery MEDICATIONS TO TAKE THE MORNING OF SURGERY WITH A SIP OF WATER: PANTOPRAZOLE    You may take these medications, IF NEEDED, the morning of surgery: NONE  Ask your surgeon/prescribing doctor for instructions on taking or stopping these medications prior to surgery: NONE   Medications to STOP  before surgery Non-Steroidal anti-inflammatory Drugs (NSAID's): for example, Diclofenac (Voltaren), Ibuprofen (Advil, Motrin), Naproxen (Aleve) 3 days  STOP all herbal supplements and vitamins(unless prescribed by your doctor), and fish oil for 7 days  Other: OZEMPIC (STOP ON 8/15/22)  (Pain medications not listed above, including Tylenol may be taken up until 4 hours prior to arrival time)   Blood  Thinners If you take Aspirin, Plavix, Coumadin, or any blood-thinning or anti-blood clot medicine, talk to the doctor who prescribed the medications for pre-operative instructions.  If you take aspirin or aspirin containing products for pain, stop 7 days prior to surgery   Going Home - or Spending the Night OUTPATIENT SURGERY: You must have a responsible adult drive you home and stay with

## 2024-08-08 NOTE — PERIOP NOTE
Preoperative instructions reviewed with patient.  Patient given two bottles of CHG soap.  Instructions (reviewed/to be reviewed in class) on use of CHG soap.  Patient given SSI infection FAQS sheet, as well as a MRSA/MSSA treatment instruction sheet with an explanation to patient that they will be notified if treatment instructions need to be initiated.  Patient was given the opportunity to ask questions on the information provided.

## 2024-08-09 LAB
BACTERIA SPEC CULT: NORMAL
BACTERIA SPEC CULT: NORMAL
SERVICE CMNT-IMP: NORMAL

## 2024-08-09 NOTE — PERIOP NOTE
GERD (gastroesophageal reflux disease) 2005    Hyperlipidemia     Hypertension     Joint pain     Musculoskeletal disorder     Prolonged emergence from general anesthesia     Sleep apnea     not using CPAP at this time    SOB (shortness of breath)       ____________________________________________  PAST SURGICAL HISTORY  Past Surgical History:   Procedure Laterality Date    ABDOMEN SURGERY  2007    REMOVE NON CANCEROUS MASS FROM COLON    CHOLECYSTECTOMY  2019    GI  2007    remove mass of colon    KNEE ARTHROSCOPY Right 2016    PROSTATECTOMY  2007    seed implantation      ____________________________________________  HOME MEDICATIONS  Current Outpatient Medications   Medication Sig    Omega-3 Fatty Acids (FISH OIL) 600 MG CAPS Take 2 capsules by mouth daily    vitamin B-12 (CYANOCOBALAMIN) 1000 MCG tablet Take 1 tablet by mouth daily    Semaglutide,0.25 or 0.5MG/DOS, (OZEMPIC, 0.25 OR 0.5 MG/DOSE,) 2 MG/3ML SOPN Inject 0.5 mg into the skin every 7 days PT TAKES ON FRIDAYS    montelukast (SINGULAIR) 10 MG tablet Take 1 tablet by mouth nightly    losartan (COZAAR) 25 MG tablet Take 1 tablet by mouth daily    azelastine (ASTELIN) 0.1 % nasal spray 2 sprays daily as needed    vitamin D (CHOLECALCIFEROL) 50 MCG (2000 UT) TABS tablet Take 1 tablet by mouth daily    atorvastatin (LIPITOR) 40 MG tablet Take 2 tablets by mouth nightly    pantoprazole (PROTONIX) 40 MG tablet Take 1 tablet by mouth daily     No current facility-administered medications for this encounter.      ____________________________________________  ALLERGIES  Allergies   Allergen Reactions    Niacin Hives and Shortness Of Breath    Rosuvastatin Shortness Of Breath    Oxycodone-Acetaminophen Rash      ____________________________________________  SOCIAL HISTORY  Social History     Tobacco Use    Smoking status: Former     Current packs/day: 0.00     Average packs/day: 1 pack/day for 37.1 years (37.1 ttl pk-yrs)     Types: Cigarettes     Start date:  08/22/2024,2359   Final    ABO/Rh 08/08/2024 A POSITIVE   Final    Antibody Screen 08/08/2024 NEG   Final    Special Requests 08/08/2024 NO SPECIAL REQUESTS    Final    Culture 08/08/2024 MRSA NOT PRESENT    Final    Culture 08/08/2024     Final                    Value:Screening of patient nares for MRSA is for surveillance purposes and, if positive, to facilitate isolation considerations in high risk settings. It is not intended for automatic decolonization interventions per se as regimens are not sufficiently effective to warrant routine use.            Skin:     Denies open wounds, cuts, sores, rashes or other areas of concern in PAT assessment.          OTILIA LEYVA - NP  Available via [email or web address]

## 2024-08-15 NOTE — PERIOP NOTE
Spoke with Dana at Dr. Aly's office who stated she will fax PET / stress test report and will fax cardiac clearance note when it is completed.    1:57 PM   Stress test results received and placed on chart.    Waiting for cardiac clearance note.    2:50 PM  Cardiac clearance received and placed on chart.    Cardiac clearance faxed to Dr. Posey's office.

## 2024-08-22 ENCOUNTER — ANESTHESIA (OUTPATIENT)
Facility: HOSPITAL | Age: 74
End: 2024-08-22
Payer: MEDICARE

## 2024-08-22 ENCOUNTER — ANESTHESIA EVENT (OUTPATIENT)
Facility: HOSPITAL | Age: 74
End: 2024-08-22
Payer: MEDICARE

## 2024-08-22 ENCOUNTER — HOSPITAL ENCOUNTER (OUTPATIENT)
Facility: HOSPITAL | Age: 74
Setting detail: OBSERVATION
Discharge: HOME | End: 2024-08-23
Attending: ORTHOPAEDIC SURGERY | Admitting: ORTHOPAEDIC SURGERY
Payer: MEDICARE

## 2024-08-22 DIAGNOSIS — Z96.651 S/P TKR (TOTAL KNEE REPLACEMENT), RIGHT: Primary | ICD-10-CM

## 2024-08-22 PROBLEM — M17.11 PRIMARY OSTEOARTHRITIS OF RIGHT KNEE: Status: ACTIVE | Noted: 2024-08-22

## 2024-08-22 LAB
GLUCOSE BLD STRIP.AUTO-MCNC: 105 MG/DL (ref 65–117)
GLUCOSE BLD STRIP.AUTO-MCNC: 117 MG/DL (ref 65–117)
GLUCOSE BLD STRIP.AUTO-MCNC: 174 MG/DL (ref 65–117)
GLUCOSE BLD STRIP.AUTO-MCNC: 192 MG/DL (ref 65–117)
GLUCOSE BLD STRIP.AUTO-MCNC: 204 MG/DL (ref 65–117)
SERVICE CMNT-IMP: ABNORMAL
SERVICE CMNT-IMP: NORMAL
SERVICE CMNT-IMP: NORMAL

## 2024-08-22 PROCEDURE — 6360000002 HC RX W HCPCS

## 2024-08-22 PROCEDURE — 6360000002 HC RX W HCPCS: Performed by: PHYSICIAN ASSISTANT

## 2024-08-22 PROCEDURE — 6360000002 HC RX W HCPCS: Performed by: ANESTHESIOLOGY

## 2024-08-22 PROCEDURE — C1776 JOINT DEVICE (IMPLANTABLE): HCPCS | Performed by: ORTHOPAEDIC SURGERY

## 2024-08-22 PROCEDURE — G0378 HOSPITAL OBSERVATION PER HR: HCPCS

## 2024-08-22 PROCEDURE — 97110 THERAPEUTIC EXERCISES: CPT

## 2024-08-22 PROCEDURE — 27447 TOTAL KNEE ARTHROPLASTY: CPT | Performed by: PHYSICIAN ASSISTANT

## 2024-08-22 PROCEDURE — 2580000003 HC RX 258: Performed by: PHYSICIAN ASSISTANT

## 2024-08-22 PROCEDURE — C1713 ANCHOR/SCREW BN/BN,TIS/BN: HCPCS | Performed by: ORTHOPAEDIC SURGERY

## 2024-08-22 PROCEDURE — 2500000003 HC RX 250 WO HCPCS

## 2024-08-22 PROCEDURE — 2500000003 HC RX 250 WO HCPCS: Performed by: ANESTHESIOLOGY

## 2024-08-22 PROCEDURE — 3700000001 HC ADD 15 MINUTES (ANESTHESIA): Performed by: ORTHOPAEDIC SURGERY

## 2024-08-22 PROCEDURE — 6370000000 HC RX 637 (ALT 250 FOR IP): Performed by: PHYSICIAN ASSISTANT

## 2024-08-22 PROCEDURE — 7100000000 HC PACU RECOVERY - FIRST 15 MIN: Performed by: ORTHOPAEDIC SURGERY

## 2024-08-22 PROCEDURE — 2580000003 HC RX 258

## 2024-08-22 PROCEDURE — 3600000015 HC SURGERY LEVEL 5 ADDTL 15MIN: Performed by: ORTHOPAEDIC SURGERY

## 2024-08-22 PROCEDURE — 3700000000 HC ANESTHESIA ATTENDED CARE: Performed by: ORTHOPAEDIC SURGERY

## 2024-08-22 PROCEDURE — 2709999900 HC NON-CHARGEABLE SUPPLY: Performed by: ORTHOPAEDIC SURGERY

## 2024-08-22 PROCEDURE — 6370000000 HC RX 637 (ALT 250 FOR IP): Performed by: ORTHOPAEDIC SURGERY

## 2024-08-22 PROCEDURE — 3600000005 HC SURGERY LEVEL 5 BASE: Performed by: ORTHOPAEDIC SURGERY

## 2024-08-22 PROCEDURE — 7100000001 HC PACU RECOVERY - ADDTL 15 MIN: Performed by: ORTHOPAEDIC SURGERY

## 2024-08-22 PROCEDURE — 64447 NJX AA&/STRD FEMORAL NRV IMG: CPT | Performed by: ANESTHESIOLOGY

## 2024-08-22 PROCEDURE — 82962 GLUCOSE BLOOD TEST: CPT

## 2024-08-22 PROCEDURE — 97161 PT EVAL LOW COMPLEX 20 MIN: CPT

## 2024-08-22 PROCEDURE — 27447 TOTAL KNEE ARTHROPLASTY: CPT | Performed by: ORTHOPAEDIC SURGERY

## 2024-08-22 PROCEDURE — 20985 CPTR-ASST DIR MS PX: CPT | Performed by: ORTHOPAEDIC SURGERY

## 2024-08-22 PROCEDURE — 6370000000 HC RX 637 (ALT 250 FOR IP): Performed by: ANESTHESIOLOGY

## 2024-08-22 PROCEDURE — 97530 THERAPEUTIC ACTIVITIES: CPT

## 2024-08-22 PROCEDURE — 2580000003 HC RX 258: Performed by: ANESTHESIOLOGY

## 2024-08-22 DEVICE — EMPOWR 3D KNEETM INS, 10R 14MM, VE
Type: IMPLANTABLE DEVICE | Site: KNEE | Status: FUNCTIONAL
Brand: DJO SURGICAL

## 2024-08-22 DEVICE — DJO EMPOWR KNEETM, FIN BP, NP 10R
Type: IMPLANTABLE DEVICE | Site: KNEE | Status: FUNCTIONAL
Brand: DJO SURGICAL

## 2024-08-22 DEVICE — IMPL CAPPED KNEE K1 TOTAL/HEMI STD CEMENTED DJO: Type: IMPLANTABLE DEVICE | Site: KNEE | Status: FUNCTIONAL

## 2024-08-22 DEVICE — CEMENT BNE MED VISC 80 GM GENTAMICIN PALACOS MV+G PRO: Type: IMPLANTABLE DEVICE | Site: KNEE | Status: FUNCTIONAL

## 2024-08-22 DEVICE — DOMED TRI-PEG PATELLA, 38X9MM, E-PLUS
Type: IMPLANTABLE DEVICE | Site: KNEE | Status: FUNCTIONAL
Brand: DJO SURGICAL

## 2024-08-22 DEVICE — EMPOWR 3D KNEETM FEMUR, NP, 9R
Type: IMPLANTABLE DEVICE | Site: KNEE | Status: FUNCTIONAL
Brand: DJO SURGICAL

## 2024-08-22 RX ORDER — SODIUM CHLORIDE 0.9 % (FLUSH) 0.9 %
5-40 SYRINGE (ML) INJECTION PRN
Status: DISCONTINUED | OUTPATIENT
Start: 2024-08-22 | End: 2024-08-23 | Stop reason: HOSPADM

## 2024-08-22 RX ORDER — SODIUM CHLORIDE 9 MG/ML
INJECTION, SOLUTION INTRAVENOUS CONTINUOUS PRN
Status: DISCONTINUED | OUTPATIENT
Start: 2024-08-22 | End: 2024-08-22 | Stop reason: SDUPTHER

## 2024-08-22 RX ORDER — KETOROLAC TROMETHAMINE 30 MG/ML
15 INJECTION, SOLUTION INTRAMUSCULAR; INTRAVENOUS EVERY 6 HOURS
Status: DISCONTINUED | OUTPATIENT
Start: 2024-08-22 | End: 2024-08-23 | Stop reason: HOSPADM

## 2024-08-22 RX ORDER — FENTANYL CITRATE 50 UG/ML
100 INJECTION, SOLUTION INTRAMUSCULAR; INTRAVENOUS
Status: COMPLETED | OUTPATIENT
Start: 2024-08-22 | End: 2024-08-22

## 2024-08-22 RX ORDER — HYDRALAZINE HYDROCHLORIDE 20 MG/ML
10 INJECTION INTRAMUSCULAR; INTRAVENOUS ONCE
Status: DISCONTINUED | OUTPATIENT
Start: 2024-08-22 | End: 2024-08-22

## 2024-08-22 RX ORDER — SODIUM CHLORIDE 0.9 % (FLUSH) 0.9 %
5-40 SYRINGE (ML) INJECTION PRN
Status: DISCONTINUED | OUTPATIENT
Start: 2024-08-22 | End: 2024-08-22

## 2024-08-22 RX ORDER — ASPIRIN 81 MG/1
81 TABLET ORAL 2 TIMES DAILY
Status: DISCONTINUED | OUTPATIENT
Start: 2024-08-22 | End: 2024-08-23 | Stop reason: HOSPADM

## 2024-08-22 RX ORDER — ROPIVACAINE HYDROCHLORIDE 5 MG/ML
INJECTION, SOLUTION EPIDURAL; INFILTRATION; PERINEURAL
Status: COMPLETED | OUTPATIENT
Start: 2024-08-22 | End: 2024-08-22

## 2024-08-22 RX ORDER — SODIUM CHLORIDE 0.9 % (FLUSH) 0.9 %
5-40 SYRINGE (ML) INJECTION PRN
Status: DISCONTINUED | OUTPATIENT
Start: 2024-08-22 | End: 2024-08-22 | Stop reason: HOSPADM

## 2024-08-22 RX ORDER — SODIUM CHLORIDE 9 MG/ML
INJECTION, SOLUTION INTRAVENOUS PRN
Status: DISCONTINUED | OUTPATIENT
Start: 2024-08-22 | End: 2024-08-22

## 2024-08-22 RX ORDER — PANTOPRAZOLE SODIUM 40 MG/1
40 TABLET, DELAYED RELEASE ORAL DAILY
Status: DISCONTINUED | OUTPATIENT
Start: 2024-08-23 | End: 2024-08-23 | Stop reason: HOSPADM

## 2024-08-22 RX ORDER — SODIUM CHLORIDE 9 MG/ML
INJECTION, SOLUTION INTRAVENOUS PRN
Status: DISCONTINUED | OUTPATIENT
Start: 2024-08-22 | End: 2024-08-22 | Stop reason: HOSPADM

## 2024-08-22 RX ORDER — FENTANYL CITRATE 50 UG/ML
25 INJECTION, SOLUTION INTRAMUSCULAR; INTRAVENOUS EVERY 5 MIN PRN
Status: DISCONTINUED | OUTPATIENT
Start: 2024-08-22 | End: 2024-08-22

## 2024-08-22 RX ORDER — PROCHLORPERAZINE EDISYLATE 5 MG/ML
5 INJECTION INTRAMUSCULAR; INTRAVENOUS
Status: DISCONTINUED | OUTPATIENT
Start: 2024-08-22 | End: 2024-08-22

## 2024-08-22 RX ORDER — MIDAZOLAM HYDROCHLORIDE 2 MG/2ML
2 INJECTION, SOLUTION INTRAMUSCULAR; INTRAVENOUS
Status: COMPLETED | OUTPATIENT
Start: 2024-08-22 | End: 2024-08-22

## 2024-08-22 RX ORDER — INSULIN LISPRO 100 [IU]/ML
0-8 INJECTION, SOLUTION INTRAVENOUS; SUBCUTANEOUS
Status: DISCONTINUED | OUTPATIENT
Start: 2024-08-22 | End: 2024-08-23 | Stop reason: HOSPADM

## 2024-08-22 RX ORDER — 0.9 % SODIUM CHLORIDE 0.9 %
500 INTRAVENOUS SOLUTION INTRAVENOUS PRN
Status: DISCONTINUED | OUTPATIENT
Start: 2024-08-22 | End: 2024-08-23 | Stop reason: HOSPADM

## 2024-08-22 RX ORDER — ACETAMINOPHEN 500 MG
1000 TABLET ORAL ONCE
Status: COMPLETED | OUTPATIENT
Start: 2024-08-22 | End: 2024-08-22

## 2024-08-22 RX ORDER — SODIUM CHLORIDE 0.9 % (FLUSH) 0.9 %
5-40 SYRINGE (ML) INJECTION EVERY 12 HOURS SCHEDULED
Status: DISCONTINUED | OUTPATIENT
Start: 2024-08-22 | End: 2024-08-22 | Stop reason: HOSPADM

## 2024-08-22 RX ORDER — LOSARTAN POTASSIUM 50 MG/1
50 TABLET ORAL DAILY
Status: DISCONTINUED | OUTPATIENT
Start: 2024-08-23 | End: 2024-08-23 | Stop reason: HOSPADM

## 2024-08-22 RX ORDER — SODIUM CHLORIDE 9 MG/ML
INJECTION, SOLUTION INTRAVENOUS PRN
Status: DISCONTINUED | OUTPATIENT
Start: 2024-08-22 | End: 2024-08-23 | Stop reason: HOSPADM

## 2024-08-22 RX ORDER — ATORVASTATIN CALCIUM 40 MG/1
80 TABLET, FILM COATED ORAL NIGHTLY
Status: DISCONTINUED | OUTPATIENT
Start: 2024-08-22 | End: 2024-08-23 | Stop reason: HOSPADM

## 2024-08-22 RX ORDER — ONDANSETRON 2 MG/ML
4 INJECTION INTRAMUSCULAR; INTRAVENOUS EVERY 6 HOURS PRN
Status: DISCONTINUED | OUTPATIENT
Start: 2024-08-22 | End: 2024-08-23 | Stop reason: HOSPADM

## 2024-08-22 RX ORDER — ONDANSETRON 4 MG/1
4 TABLET, ORALLY DISINTEGRATING ORAL EVERY 8 HOURS PRN
Status: DISCONTINUED | OUTPATIENT
Start: 2024-08-22 | End: 2024-08-23 | Stop reason: HOSPADM

## 2024-08-22 RX ORDER — ACETAMINOPHEN 500 MG
500 TABLET ORAL
Status: DISCONTINUED | OUTPATIENT
Start: 2024-08-22 | End: 2024-08-23 | Stop reason: HOSPADM

## 2024-08-22 RX ORDER — OXYCODONE HYDROCHLORIDE 5 MG/1
2.5 TABLET ORAL
Status: DISCONTINUED | OUTPATIENT
Start: 2024-08-22 | End: 2024-08-23 | Stop reason: HOSPADM

## 2024-08-22 RX ORDER — HYDROMORPHONE HYDROCHLORIDE 1 MG/ML
0.5 INJECTION, SOLUTION INTRAMUSCULAR; INTRAVENOUS; SUBCUTANEOUS EVERY 5 MIN PRN
Status: COMPLETED | OUTPATIENT
Start: 2024-08-22 | End: 2024-08-22

## 2024-08-22 RX ORDER — SODIUM CHLORIDE 0.9 % (FLUSH) 0.9 %
5-40 SYRINGE (ML) INJECTION EVERY 12 HOURS SCHEDULED
Status: DISCONTINUED | OUTPATIENT
Start: 2024-08-22 | End: 2024-08-22

## 2024-08-22 RX ORDER — BISACODYL 10 MG
10 SUPPOSITORY, RECTAL RECTAL DAILY PRN
Status: DISCONTINUED | OUTPATIENT
Start: 2024-08-22 | End: 2024-08-23 | Stop reason: HOSPADM

## 2024-08-22 RX ORDER — CELECOXIB 200 MG/1
200 CAPSULE ORAL ONCE
Status: COMPLETED | OUTPATIENT
Start: 2024-08-22 | End: 2024-08-22

## 2024-08-22 RX ORDER — SENNA AND DOCUSATE SODIUM 50; 8.6 MG/1; MG/1
1 TABLET, FILM COATED ORAL 2 TIMES DAILY
Status: DISCONTINUED | OUTPATIENT
Start: 2024-08-22 | End: 2024-08-23 | Stop reason: HOSPADM

## 2024-08-22 RX ORDER — NALOXONE HYDROCHLORIDE 0.4 MG/ML
INJECTION, SOLUTION INTRAMUSCULAR; INTRAVENOUS; SUBCUTANEOUS PRN
Status: DISCONTINUED | OUTPATIENT
Start: 2024-08-22 | End: 2024-08-22

## 2024-08-22 RX ORDER — SODIUM CHLORIDE, SODIUM LACTATE, POTASSIUM CHLORIDE, CALCIUM CHLORIDE 600; 310; 30; 20 MG/100ML; MG/100ML; MG/100ML; MG/100ML
INJECTION, SOLUTION INTRAVENOUS CONTINUOUS
Status: DISCONTINUED | OUTPATIENT
Start: 2024-08-22 | End: 2024-08-22 | Stop reason: HOSPADM

## 2024-08-22 RX ORDER — LIDOCAINE HYDROCHLORIDE 10 MG/ML
1 INJECTION, SOLUTION EPIDURAL; INFILTRATION; INTRACAUDAL; PERINEURAL
Status: DISCONTINUED | OUTPATIENT
Start: 2024-08-22 | End: 2024-08-22 | Stop reason: HOSPADM

## 2024-08-22 RX ORDER — FENTANYL CITRATE 50 UG/ML
INJECTION, SOLUTION INTRAMUSCULAR; INTRAVENOUS PRN
Status: DISCONTINUED | OUTPATIENT
Start: 2024-08-22 | End: 2024-08-22 | Stop reason: SDUPTHER

## 2024-08-22 RX ORDER — HYDROMORPHONE HYDROCHLORIDE 1 MG/ML
0.5 INJECTION, SOLUTION INTRAMUSCULAR; INTRAVENOUS; SUBCUTANEOUS EVERY 4 HOURS PRN
Status: DISCONTINUED | OUTPATIENT
Start: 2024-08-22 | End: 2024-08-23 | Stop reason: HOSPADM

## 2024-08-22 RX ORDER — DEXAMETHASONE SODIUM PHOSPHATE 4 MG/ML
INJECTION, SOLUTION INTRA-ARTICULAR; INTRALESIONAL; INTRAMUSCULAR; INTRAVENOUS; SOFT TISSUE PRN
Status: DISCONTINUED | OUTPATIENT
Start: 2024-08-22 | End: 2024-08-22 | Stop reason: SDUPTHER

## 2024-08-22 RX ORDER — ACETAMINOPHEN 500 MG
1000 TABLET ORAL ONCE
Status: DISCONTINUED | OUTPATIENT
Start: 2024-08-22 | End: 2024-08-22 | Stop reason: HOSPADM

## 2024-08-22 RX ORDER — ONDANSETRON 2 MG/ML
4 INJECTION INTRAMUSCULAR; INTRAVENOUS
Status: DISCONTINUED | OUTPATIENT
Start: 2024-08-22 | End: 2024-08-22

## 2024-08-22 RX ORDER — OXYCODONE HYDROCHLORIDE 5 MG/1
5 TABLET ORAL
Status: DISCONTINUED | OUTPATIENT
Start: 2024-08-22 | End: 2024-08-23 | Stop reason: HOSPADM

## 2024-08-22 RX ORDER — INSULIN LISPRO 100 [IU]/ML
0-4 INJECTION, SOLUTION INTRAVENOUS; SUBCUTANEOUS NIGHTLY
Status: DISCONTINUED | OUTPATIENT
Start: 2024-08-22 | End: 2024-08-23 | Stop reason: HOSPADM

## 2024-08-22 RX ORDER — HYDROXYZINE HYDROCHLORIDE 10 MG/1
10 TABLET, FILM COATED ORAL EVERY 8 HOURS PRN
Status: DISCONTINUED | OUTPATIENT
Start: 2024-08-22 | End: 2024-08-23 | Stop reason: HOSPADM

## 2024-08-22 RX ORDER — ONDANSETRON 2 MG/ML
INJECTION INTRAMUSCULAR; INTRAVENOUS PRN
Status: DISCONTINUED | OUTPATIENT
Start: 2024-08-22 | End: 2024-08-22 | Stop reason: SDUPTHER

## 2024-08-22 RX ORDER — SODIUM CHLORIDE 0.9 % (FLUSH) 0.9 %
5-40 SYRINGE (ML) INJECTION EVERY 12 HOURS SCHEDULED
Status: DISCONTINUED | OUTPATIENT
Start: 2024-08-22 | End: 2024-08-23 | Stop reason: HOSPADM

## 2024-08-22 RX ORDER — OXYCODONE HYDROCHLORIDE 5 MG/1
5 TABLET ORAL
Status: DISCONTINUED | OUTPATIENT
Start: 2024-08-22 | End: 2024-08-22

## 2024-08-22 RX ORDER — DEXTROSE MONOHYDRATE 100 MG/ML
INJECTION, SOLUTION INTRAVENOUS CONTINUOUS PRN
Status: DISCONTINUED | OUTPATIENT
Start: 2024-08-22 | End: 2024-08-23 | Stop reason: HOSPADM

## 2024-08-22 RX ORDER — SODIUM CHLORIDE 9 MG/ML
INJECTION, SOLUTION INTRAVENOUS CONTINUOUS
Status: DISCONTINUED | OUTPATIENT
Start: 2024-08-22 | End: 2024-08-23 | Stop reason: HOSPADM

## 2024-08-22 RX ORDER — TRANEXAMIC ACID 100 MG/ML
INJECTION, SOLUTION INTRAVENOUS PRN
Status: DISCONTINUED | OUTPATIENT
Start: 2024-08-22 | End: 2024-08-22 | Stop reason: SDUPTHER

## 2024-08-22 RX ADMIN — SODIUM CHLORIDE: 9 INJECTION, SOLUTION INTRAVENOUS at 13:03

## 2024-08-22 RX ADMIN — HYDROMORPHONE HYDROCHLORIDE 0.5 MG: 1 INJECTION, SOLUTION INTRAMUSCULAR; INTRAVENOUS; SUBCUTANEOUS at 13:34

## 2024-08-22 RX ADMIN — ACETAMINOPHEN 500 MG: 500 TABLET ORAL at 21:27

## 2024-08-22 RX ADMIN — ONDANSETRON 4 MG: 2 INJECTION INTRAMUSCULAR; INTRAVENOUS at 10:32

## 2024-08-22 RX ADMIN — FENTANYL CITRATE 25 MCG: 50 INJECTION, SOLUTION INTRAMUSCULAR; INTRAVENOUS at 12:09

## 2024-08-22 RX ADMIN — ROPIVACAINE HYDROCHLORIDE 20 ML: 5 INJECTION, SOLUTION EPIDURAL; INFILTRATION; PERINEURAL at 10:30

## 2024-08-22 RX ADMIN — DEXAMETHASONE SODIUM PHOSPHATE 4 MG: 4 INJECTION, SOLUTION INTRAMUSCULAR; INTRAVENOUS at 10:32

## 2024-08-22 RX ADMIN — FENTANYL CITRATE 50 MCG: 50 INJECTION INTRAMUSCULAR; INTRAVENOUS at 10:22

## 2024-08-22 RX ADMIN — PROPOFOL 30 MCG/KG/MIN: 10 INJECTION, EMULSION INTRAVENOUS at 10:33

## 2024-08-22 RX ADMIN — MIDAZOLAM 2 MG: 1 INJECTION INTRAMUSCULAR; INTRAVENOUS at 10:22

## 2024-08-22 RX ADMIN — ASPIRIN 81 MG: 81 TABLET, COATED ORAL at 14:55

## 2024-08-22 RX ADMIN — SODIUM CHLORIDE, POTASSIUM CHLORIDE, SODIUM LACTATE AND CALCIUM CHLORIDE: 600; 310; 30; 20 INJECTION, SOLUTION INTRAVENOUS at 09:13

## 2024-08-22 RX ADMIN — SODIUM CHLORIDE 3000 MG: 900 INJECTION INTRAVENOUS at 18:31

## 2024-08-22 RX ADMIN — SODIUM CHLORIDE: 9 INJECTION, SOLUTION INTRAVENOUS at 21:33

## 2024-08-22 RX ADMIN — PHENYLEPHRINE HYDROCHLORIDE 40 MCG/MIN: 10 INJECTION INTRAVENOUS at 10:40

## 2024-08-22 RX ADMIN — KETOROLAC TROMETHAMINE 15 MG: 30 INJECTION, SOLUTION INTRAMUSCULAR at 18:29

## 2024-08-22 RX ADMIN — TRANEXAMIC ACID 1000 MG: 100 INJECTION, SOLUTION INTRAVENOUS at 10:41

## 2024-08-22 RX ADMIN — ATORVASTATIN CALCIUM 80 MG: 40 TABLET, FILM COATED ORAL at 21:24

## 2024-08-22 RX ADMIN — ACETAMINOPHEN 500 MG: 500 TABLET ORAL at 16:23

## 2024-08-22 RX ADMIN — CELECOXIB 200 MG: 200 CAPSULE ORAL at 09:22

## 2024-08-22 RX ADMIN — OXYCODONE HYDROCHLORIDE 5 MG: 5 TABLET ORAL at 21:27

## 2024-08-22 RX ADMIN — MEPIVACAINE HYDROCHLORIDE 52.5 MG: 15 INJECTION, SOLUTION EPIDURAL; INFILTRATION at 10:43

## 2024-08-22 RX ADMIN — HYDROMORPHONE HYDROCHLORIDE 0.5 MG: 1 INJECTION, SOLUTION INTRAMUSCULAR; INTRAVENOUS; SUBCUTANEOUS at 13:09

## 2024-08-22 RX ADMIN — ACETAMINOPHEN 1000 MG: 500 TABLET ORAL at 09:22

## 2024-08-22 RX ADMIN — SODIUM CHLORIDE 3000 MG: 900 INJECTION INTRAVENOUS at 10:49

## 2024-08-22 RX ADMIN — ACETAMINOPHEN 500 MG: 500 TABLET ORAL at 14:55

## 2024-08-22 RX ADMIN — SODIUM CHLORIDE: 9 INJECTION, SOLUTION INTRAVENOUS at 11:37

## 2024-08-22 ASSESSMENT — PAIN DESCRIPTION - ORIENTATION
ORIENTATION: RIGHT

## 2024-08-22 ASSESSMENT — PAIN DESCRIPTION - DESCRIPTORS
DESCRIPTORS: ACHING
DESCRIPTORS: ACHING

## 2024-08-22 ASSESSMENT — PAIN SCALES - GENERAL
PAINLEVEL_OUTOF10: 4
PAINLEVEL_OUTOF10: 5
PAINLEVEL_OUTOF10: 5

## 2024-08-22 ASSESSMENT — PAIN DESCRIPTION - LOCATION
LOCATION: KNEE

## 2024-08-22 ASSESSMENT — PAIN - FUNCTIONAL ASSESSMENT
PAIN_FUNCTIONAL_ASSESSMENT: ACTIVITIES ARE NOT PREVENTED
PAIN_FUNCTIONAL_ASSESSMENT: 0-10
PAIN_FUNCTIONAL_ASSESSMENT: ACTIVITIES ARE NOT PREVENTED

## 2024-08-22 NOTE — PLAN OF CARE
Problem: Physical Therapy - Adult  Goal: By Discharge: Performs mobility at highest level of function for planned discharge setting.  See evaluation for individualized goals.  Description: FUNCTIONAL STATUS PRIOR TO ADMISSION: Patient was independent and active without use of DME.    HOME SUPPORT PRIOR TO ADMISSION: The patient lived with wife but did not require assistance.    Physical Therapy Goals  Initiated 8/22/2024  1.  Patient will move from supine to sit and sit to supine, scoot up and down, and roll side to side in bed with independence within 4 day(s).    2.  Patient will perform sit to stand with modified independence within 4 day(s).  3.  Patient will transfer from bed to chair and chair to bed with modified independence using the least restrictive device within 4 day(s).  4.  Patient will ambulate with supervision/set-up for 200 feet with the least restrictive device within 4 day(s).   5.  Patient will ascend/descend 4 stairs with no handrail(s) with contact guard assist and appropriate assistive device within 4 day(s).  6. Patient will perform tka home exercise program per protocol with modified independence within 4 days.  7. Patient will demonstrate AROM 0-90 degrees in operative joint within 4 days.     Outcome: Progressing       PHYSICAL THERAPY EVALUATION    Patient: Sahil Carcamo (73 y.o. male)  Date: 8/22/2024  Primary Diagnosis: Osteoarthritis of right knee [M17.11]  Primary osteoarthritis of right knee [M17.11]  Procedure(s) (LRB):  RIGHT TOTAL KNEE ARTHROPLASTY (SPINAL WITH BLOCK) (Right) Day of Surgery   Precautions:                        ASSESSMENT :   DEFICITS/IMPAIRMENTS:   Based on the objective data described below, the patient presents with  impairment in functional mobility, activity tolerance and balance s/p left tka  PLOF: Independent with ADLs and IADLs. Patient lives with wife in a 2 story home with 12 steps with rail to second floor bed & bath and 4 steps with rail to  understanding    Thank you for this referral.  Jurgen Castanon, PT  Minutes: 38      Physical Therapy Evaluation Charge Determination   History Examination Presentation Decision-Making   LOW Complexity : Zero comorbidities / personal factors that will impact the outcome / POC LOW Complexity : 1-2 Standardized tests and measures addressing body structure, function, activity limitation and / or participation in recreation  LOW Complexity : Stable, uncomplicated  AM-PAC  LOW    Based on the above components, the patient evaluation is determined to be of the following complexity level: Low

## 2024-08-22 NOTE — DISCHARGE INSTRUCTIONS
Post-op Discharge Instructions Following Total Joint Replacement  Chan Posey MD  [facility]  [phone]  Follow-up Office Visit  See Dr. Posey approximately 3-4 weeks from date of surgery. Call [phone] to make an appointment.  If you have any postop questions for Dr. Posey's clinical team, please call [phone].  Activity  Use your walker for ambulation.  Weight bearing as tolerated unless instructed otherwise by the physical therapist. Get up every hour you are awake and take a brief walk. Lengthen walking distance daily as your strength improves.  Continue using your walker until seen in the office for your first follow up visit.  Practice your exercises 3 times daily as instructed by the physical therapist. Ice for 20 minutes after exercising.  No driving until seen in the office for your first follow up visit.  Incision Care  The surgical dressing is waterproof and is to remain on your incision for 7 days. On the 7th day, carefully lift the edge of the dressing to break the adhesive seal and gently peel it off.  If your surgical dressing comes loose or falls off before the 7th day, replace it with a dry sterile gauze dressing and change this dressing daily. Once there is no drainage on the bandage, you mean leave the incision open to air.  You may take a shower with the surgical dressing in place. After you remove the surgical dressing on day 7, you may continue to shower and get your incision wet in the shower. Do not submerge your incision under water in a bathtub, hot tub, swimming pool, etc. until after you have been evaluated at your first office visit.  Medications  Blood Clot Prevention: Take medication as prescribed by your physician for 4 weeks postop.  Pain Management: Take pain medication as prescribed; wean yourself off of pain medication as your pain lessens. Take with food.  You make also take Tylenol every 4-6 hours as needed for pain.  Do not exceed 3 grams (3000mg) per

## 2024-08-22 NOTE — BRIEF OP NOTE
Brief Postoperative Note      Patient: Sahil Carcamo  YOB: 1950  MRN: [id number]    Date of Procedure: 8/22/2024    Pre-Op Diagnosis Codes:      * Osteoarthritis of right knee [M17.11]    Post-Op Diagnosis: Same       Procedure(s):  RIGHT TOTAL KNEE ARTHROPLASTY (SPINAL WITH BLOCK)    Surgeon(s):  Chan Posey MD    Assistant:  Surgical Assistant: Chito Conde  Physician Assistant: Shaunna Arango PA-C    Anesthesia: Spinal    Estimated Blood Loss (mL): 200     Complications: None    Specimens:   * No specimens in log *    Implants:  Implant Name Type Inv. Item Serial No.  Lot No. LRB No. Used Action   CEMENT BNE MED VISC 80 GM GENTAMICIN PALACOS MV+G PRO - SNA  CEMENT BNE MED VISC 80 GM GENTAMICIN PALACOS MV+G PRO NA [facility] [id number] Right 1 Implanted   BASEPLATE TIB SZ 10 RT KNEE NP STEMMABLE EMPOWR - SN/A  BASEPLATE TIB SZ 10 RT KNEE NP STEMMABLE EMPOWR N/A [facility] [id number] Right 1 Implanted   COMPONENT FEM SZ 9 RT KNEE NP EMPOWR 3D - SN/A  COMPONENT FEM SZ 9 RT KNEE NP EMPOWR 3D N/A [facility] [id number] Right 1 Implanted   INSERT TIB SZ 10 [id number] RT KNEE EMPOWR 3D E + - SN/A  INSERT TIB SZ 10 [id number] RT KNEE EMPOWR 3D E + N/A [employer] [facility] [id number] Right 1 Implanted   COMPONENT PAT OD38MM THK9MM POLYETH DOMED STYL TRI PEG EPIK - SN/A  COMPONENT PAT OD38MM THK9MM POLYETH DOMED STYL TRI PEG EPIK N/A [employer] [facility] [id number] Right 1 Implanted         Drains: * No LDAs found *    Findings:  Infection Present At Time Of Surgery (PATOS) (choose all levels that have infection present):  No infection present  Other Findings: oa right knee    Electronically signed by Shaunna Arango PA-C on 8/22/2024 at 12:53 PM

## 2024-08-22 NOTE — ANESTHESIA PROCEDURE NOTES
Spinal Block    End time: 8/22/2024 10:45 AM  Reason for block: primary anesthetic  Staffing  Performed: resident/CRNA   Resident/CRNA: Lizett Mason APRN - CRNA  Performed by: Lizett Mason APRN - CRNA  Authorized by: Angela Dunn DO    Spinal Block  Patient position: sitting  Prep: Betadine  Patient monitoring: cardiac monitor, continuous pulse ox, continuous capnometry, frequent blood pressure checks and oxygen  Approach: midline  Location: L3/L4  Provider prep: mask and sterile gloves  Local infiltration: lidocaine  Needle  Needle type: Sprotte Tip   Needle gauge: 24 G  Needle length: 4 in  Assessment  Sensory level: T10  Swirl obtained: Yes  CSF: clear  Attempts: 1  Hemodynamics: stable  Preanesthetic Checklist  Completed: patient identified, IV checked, site marked, risks and benefits discussed, surgical/procedural consents, equipment checked, pre-op evaluation, timeout performed, anesthesia consent given, oxygen available, monitors applied/VS acknowledged, fire risk safety assessment completed and verbalized and blood product R/B/A discussed and consented

## 2024-08-22 NOTE — ANESTHESIA PROCEDURE NOTES
Peripheral Block    Patient location during procedure: pre-op  Reason for block: post-op pain management and at surgeon's request  Start time: 8/22/2024 10:30 AM  Staffing  Performed: anesthesiologist   Performed by: Angela Dunn DO  Authorized by: Angela Dunn DO    Preanesthetic Checklist  Completed: patient identified, IV checked, site marked, risks and benefits discussed, surgical/procedural consents, equipment checked, pre-op evaluation, timeout performed, anesthesia consent given, oxygen available, monitors applied/VS acknowledged and fire risk safety assessment completed and verbalized  Peripheral Block   Patient position: supine  Prep: ChloraPrep  Provider prep: mask and sterile gloves  Patient monitoring: cardiac monitor, continuous pulse ox, frequent blood pressure checks, IV access and oxygen  Block type: Saphenous  Laterality: right  Injection technique: single-shot  Guidance: ultrasound guided  Local infiltration: ropivacaine  Infiltration strength: 0.5 %  Local infiltration: ropivacaineDose: 30 mL    Needle   Needle type: insulated echogenic nerve stimulator needle   Needle gauge: 21 G  Needle localization: anatomical landmarks and ultrasound guidance  Needle length: 10 cm  Assessment   Injection assessment: negative aspiration for heme, no paresthesia on injection, local visualized surrounding nerve on ultrasound and no intravascular symptoms  Paresthesia pain: none  Slow fractionated injection: yes  Hemodynamics: stable  Outcomes: uncomplicated and patient tolerated procedure well    Medications Administered  ropivacaine (NAROPIN) injection 0.5% - Perineural   20 mL - 8/22/2024 10:30:00 AM

## 2024-08-22 NOTE — PROGRESS NOTES
Ortho NP Note    POD# 0  s/p RIGHT TOTAL KNEE ARTHROPLASTY (SPINAL WITH BLOCK)   Pt seen with wife at bedside.     Pt resting in bed comfortably. Very pleasant.   Reports minimal postop knee pain , aware of prn oxycodone   Able to mobilize well with PT.   Tolerating regular diet. No nausea.     Concerned about home environment and asking about rehab, bedroom on 2nd floor but has option to stay on first floor with bathroom on first level as well. Discussed options of SNF, IPR likely not an option, he would prefer to go home over d/c to SNF.     VSS Afebrile.    Visit Vitals  /68   Pulse 54   Temp 97.6 °F (36.4 °C) (Oral)   Resp 16   Ht 1.803 m (5' 10.98\")   Wt 125.5 kg (276 lb 10.8 oz)   SpO2 94%   BMI 38.61 kg/m²       Voiding status: due to void          Labs    Lab Results   Component Value Date/Time    HGB 14.9 09/10/2021 06:16 AM      Lab Results   Component Value Date/Time    INR 1.0 08/08/2024 11:38 AM      Lab Results   Component Value Date/Time     09/10/2021 06:16 AM    K 3.9 09/10/2021 06:16 AM     09/10/2021 06:16 AM    CO2 27 09/10/2021 06:16 AM    BUN 22 09/10/2021 06:16 AM     Recent Glucose Results:   Glucose   Date Value Ref Range Status   09/10/2021 169 (H) 65 - 100 mg/dL Final   06/26/2019 98 65 - 99 mg/dL Final           Body mass index is 38.61 kg/m². : A BMI > 30 is classified as obesity and > 40 is classified as morbid obesity.       Dressing c.d.i  Cryotherapy in place over incision  Calves soft and supple; No pain with passive stretch  Sensation and motor intact. +PF/DF/EHL intact   SCDs for mechanical DVT proph while in bed     PLAN:  1) PT BID - WBAT  2) Aspirin 81 mg PO BID for DVT Prophylaxis. Encouraged early mobilization, bed exercises, and SCD use.  3) Pain control - scheduled tylenol  and toradol, and prn  oxycodone  .  4) Post op care - Continue bowel regimen, encouraged IS. Straight cath per protocol. Optifoam/Mepilex to remain in place x 7 day  days unless  integrity is lost.    5) Readniess for discharge:     [x] Vital Signs stable    [] Hgb stable    [] + Voiding    [x] Wound intact, drainage minimal    [x] Tolerating PO intake     [] Cleared by PT (OT if applicable)     [] Stair training completed (if applicable)    [] Independent / Contact Guard Assist (household distance)     [] Bed mobility     [] Car transfers     [] ADL’s    [x] Adequate pain control on oral medication alone     Routine postop care.   Plans to return home with HH and wife's support.     OTILIA Arroyo - NP

## 2024-08-22 NOTE — FLOWSHEET NOTE
08/22/24 1107   Family Communication    Relationship to Patient Spouse    Phone Number Ting Carcamo [phone]   Family/Significant Other Update Called   Delivery Origin Nurse   Family Communication   Family Update Message Procedure started

## 2024-08-22 NOTE — ANESTHESIA PRE PROCEDURE
Department of Anesthesiology  Preprocedure Note       Name:  Sahil Carcamo   Age:  73 y.o.  :  1950                                          MRN:  [id number]         Date:  2024      Surgeon: Surgeon(s):  Chan Posey MD    Procedure: Procedure(s):  RIGHT TOTAL KNEE ARTHROPLASTY (SPINAL WITH BLOCK)    Medications prior to admission:   Prior to Admission medications    Medication Sig Start Date End Date Taking? Authorizing Provider   losartan (COZAAR) 25 MG tablet Take 2 tablets by mouth daily   Yes Feli Kapoor MD   atorvastatin (LIPITOR) 40 MG tablet Take 2 tablets by mouth nightly   Yes Automatic Reconciliation, Ar   pantoprazole (PROTONIX) 40 MG tablet Take 1 tablet by mouth daily   Yes Automatic Reconciliation, Ar   Omega-3 Fatty Acids (FISH OIL) 600 MG CAPS Take 2 capsules by mouth daily    Feli Kapoor MD   vitamin B-12 (CYANOCOBALAMIN) 1000 MCG tablet Take 1 tablet by mouth daily    Feli Kapoor MD   Semaglutide,0.25 or 0.5MG/DOS, (OZEMPIC, 0.25 OR 0.5 MG/DOSE,) 2 MG/3ML SOPN Inject 0.5 mg into the skin every 7 days PT TAKES ON     Feli Kapoor MD   montelukast (SINGULAIR) 10 MG tablet Take 1 tablet by mouth nightly    Feli Kapoor MD   azelastine (ASTELIN) 0.1 % nasal spray 2 sprays daily as needed 24  Feli Kapoor MD   vitamin D (CHOLECALCIFEROL) 50 MCG (2000) TABS tablet Take 1 tablet by mouth daily    Feli Kapoor MD       Current medications:    Current Facility-Administered Medications   Medication Dose Route Frequency Provider Last Rate Last Admin    acetaminophen (TYLENOL) tablet 1,000 mg  1,000 mg Oral Once Shaunna Arango PA-C        tranexamic acid (CYKLOKAPRON) 1,000 mg in sodium chloride 0.9 % 110 mL IVPB (mini-bag)  1,000 mg IntraVENous On Call to OR Shaunna Arango PA-C        sodium chloride flush 0.9 % injection 5-40 mL  5-40 mL IntraVENous 2 times per day Nba

## 2024-08-23 VITALS
SYSTOLIC BLOOD PRESSURE: 116 MMHG | HEIGHT: 71 IN | TEMPERATURE: 97.4 F | WEIGHT: 276.68 LBS | RESPIRATION RATE: 18 BRPM | HEART RATE: 57 BPM | OXYGEN SATURATION: 97 % | BODY MASS INDEX: 38.73 KG/M2 | DIASTOLIC BLOOD PRESSURE: 64 MMHG

## 2024-08-23 LAB
ANION GAP SERPL CALC-SCNC: 3 MMOL/L (ref 5–15)
BUN SERPL-MCNC: 22 MG/DL (ref 6–20)
BUN/CREAT SERPL: 17 (ref 12–20)
CALCIUM SERPL-MCNC: 8.2 MG/DL (ref 8.5–10.1)
CHLORIDE SERPL-SCNC: 112 MMOL/L (ref 97–108)
CO2 SERPL-SCNC: 27 MMOL/L (ref 21–32)
CREAT SERPL-MCNC: 1.32 MG/DL (ref 0.7–1.3)
GLUCOSE BLD STRIP.AUTO-MCNC: 119 MG/DL (ref 65–117)
GLUCOSE SERPL-MCNC: 120 MG/DL (ref 65–100)
HCT VFR BLD AUTO: 31.9 % (ref 36.6–50.3)
HGB BLD-MCNC: 10.8 G/DL (ref 12.1–17)
POTASSIUM SERPL-SCNC: 4.3 MMOL/L (ref 3.5–5.1)
SERVICE CMNT-IMP: ABNORMAL
SODIUM SERPL-SCNC: 142 MMOL/L (ref 136–145)

## 2024-08-23 PROCEDURE — APPNB60 APP NON BILLABLE TIME 46-60 MINS: Performed by: NURSE PRACTITIONER

## 2024-08-23 PROCEDURE — 6370000000 HC RX 637 (ALT 250 FOR IP): Performed by: PHYSICIAN ASSISTANT

## 2024-08-23 PROCEDURE — 97530 THERAPEUTIC ACTIVITIES: CPT

## 2024-08-23 PROCEDURE — 6360000002 HC RX W HCPCS: Performed by: PHYSICIAN ASSISTANT

## 2024-08-23 PROCEDURE — 97110 THERAPEUTIC EXERCISES: CPT

## 2024-08-23 PROCEDURE — 80048 BASIC METABOLIC PNL TOTAL CA: CPT

## 2024-08-23 PROCEDURE — 96376 TX/PRO/DX INJ SAME DRUG ADON: CPT

## 2024-08-23 PROCEDURE — 85014 HEMATOCRIT: CPT

## 2024-08-23 PROCEDURE — G0378 HOSPITAL OBSERVATION PER HR: HCPCS

## 2024-08-23 PROCEDURE — 36415 COLL VENOUS BLD VENIPUNCTURE: CPT

## 2024-08-23 PROCEDURE — 2580000003 HC RX 258: Performed by: PHYSICIAN ASSISTANT

## 2024-08-23 PROCEDURE — 85018 HEMOGLOBIN: CPT

## 2024-08-23 PROCEDURE — 96374 THER/PROPH/DIAG INJ IV PUSH: CPT

## 2024-08-23 PROCEDURE — 82962 GLUCOSE BLOOD TEST: CPT

## 2024-08-23 RX ORDER — ASPIRIN 81 MG/1
81 TABLET ORAL 2 TIMES DAILY
Qty: 60 TABLET | Refills: 0 | Status: SHIPPED | OUTPATIENT
Start: 2024-08-23 | End: 2024-09-22

## 2024-08-23 RX ORDER — OXYCODONE HYDROCHLORIDE 5 MG/1
2.5-5 TABLET ORAL EVERY 4 HOURS PRN
Qty: 42 TABLET | Refills: 0 | Status: SHIPPED | OUTPATIENT
Start: 2024-08-23 | End: 2024-08-30

## 2024-08-23 RX ORDER — SENNA AND DOCUSATE SODIUM 50; 8.6 MG/1; MG/1
1 TABLET, FILM COATED ORAL 2 TIMES DAILY
Qty: 60 TABLET | Refills: 0 | Status: SHIPPED | OUTPATIENT
Start: 2024-08-23

## 2024-08-23 RX ORDER — ACETAMINOPHEN 500 MG
500 TABLET ORAL EVERY 4 HOURS
Qty: 100 TABLET | Refills: 1 | Status: SHIPPED | OUTPATIENT
Start: 2024-08-23

## 2024-08-23 RX ADMIN — KETOROLAC TROMETHAMINE 15 MG: 30 INJECTION, SOLUTION INTRAMUSCULAR at 01:59

## 2024-08-23 RX ADMIN — PANTOPRAZOLE SODIUM 40 MG: 40 TABLET, DELAYED RELEASE ORAL at 10:12

## 2024-08-23 RX ADMIN — SODIUM CHLORIDE 3000 MG: 900 INJECTION INTRAVENOUS at 02:11

## 2024-08-23 RX ADMIN — ACETAMINOPHEN 500 MG: 500 TABLET ORAL at 10:12

## 2024-08-23 RX ADMIN — ACETAMINOPHEN 500 MG: 500 TABLET ORAL at 06:40

## 2024-08-23 RX ADMIN — ASPIRIN 81 MG: 81 TABLET, COATED ORAL at 10:12

## 2024-08-23 RX ADMIN — OXYCODONE HYDROCHLORIDE 5 MG: 5 TABLET ORAL at 10:10

## 2024-08-23 RX ADMIN — KETOROLAC TROMETHAMINE 15 MG: 30 INJECTION, SOLUTION INTRAMUSCULAR at 06:46

## 2024-08-23 ASSESSMENT — PAIN - FUNCTIONAL ASSESSMENT
PAIN_FUNCTIONAL_ASSESSMENT: PREVENTS OR INTERFERES SOME ACTIVE ACTIVITIES AND ADLS

## 2024-08-23 ASSESSMENT — PAIN SCALES - GENERAL
PAINLEVEL_OUTOF10: 7
PAINLEVEL_OUTOF10: 4
PAINLEVEL_OUTOF10: 5
PAINLEVEL_OUTOF10: 4
PAINLEVEL_OUTOF10: 4

## 2024-08-23 ASSESSMENT — PAIN DESCRIPTION - PAIN TYPE
TYPE: SURGICAL PAIN
TYPE: SURGICAL PAIN

## 2024-08-23 ASSESSMENT — PAIN DESCRIPTION - ORIENTATION
ORIENTATION: RIGHT

## 2024-08-23 ASSESSMENT — PAIN DESCRIPTION - LOCATION
LOCATION: KNEE

## 2024-08-23 ASSESSMENT — PAIN DESCRIPTION - DESCRIPTORS: DESCRIPTORS: ACHING;SORE

## 2024-08-23 ASSESSMENT — PAIN DESCRIPTION - FREQUENCY
FREQUENCY: INTERMITTENT
FREQUENCY: INTERMITTENT

## 2024-08-23 ASSESSMENT — PAIN DESCRIPTION - ONSET: ONSET: PROGRESSIVE

## 2024-08-23 NOTE — DISCHARGE SUMMARY
Ortho Discharge Summary    Patient ID:  Sahil Carcamo  [id number]  male  73 y.o.  1950    Admit date: 8/22/2024    Discharge date: 8/23/2024    Admitting Physician: Chan Posey MD     Consulting Physician(s):   Treatment Team:   Chan Posey MD Hull, Jason, MD Holton, Jurgen Giles, PT  Xenia Geiger RN Williams, Shelby R Rew, Cathy L, RN    Date of Surgery:   8/22/2024     Preoperative Diagnosis:  Osteoarthritis of right knee [M17.11]    Postoperative Diagnosis:   * No post-op diagnosis entered *    Procedure(s):   RIGHT TOTAL KNEE ARTHROPLASTY (SPINAL WITH BLOCK)     Anesthesia Type:   Spinal     Surgeon: Chan Posey MD                            HPI:  Pt is a 73 y.o. male who has a history of Osteoarthritis of right knee [M17.11]  with pain and limitations of activities of daily living who presents at this time for a RIGHT TOTAL KNEE ARTHROPLASTY (SPINAL WITH BLOCK) following the failure of conservative management.    PMH:   Past Medical History:   Diagnosis Date    Arthritis 2008    CAD (coronary artery disease) 2014    h/o MI with stent    Cancer (HCC) 2007    prostate    Diabetes mellitus (HCC)     Dyspepsia and other specified disorders of function of stomach     GERD (gastroesophageal reflux disease) 2005    Hyperlipidemia     Hypertension     Joint pain     Musculoskeletal disorder     Prolonged emergence from general anesthesia     Sleep apnea     not using CPAP at this time    SOB (shortness of breath)        Body mass index is 38.61 kg/m². : A BMI > 30 is classified as obesity and > 40 is classified as morbid obesity.     Medications upon admission :   Prior to Admission Medications   Prescriptions Last Dose Informant Patient Reported? Taking?   Omega-3 Fatty Acids (FISH OIL) 600 MG CAPS 8/15/2024  Yes No   Sig: Take 2 capsules by mouth daily   Semaglutide,0.25 or 0.5MG/DOS, (OZEMPIC, 0.25 OR 0.5 MG/DOSE,) 2 MG/3ML SOPN 8/9/2024  Yes No   Sig: Inject 0.5 mg into the skin every 7 days PT  TAKES ON    atorvastatin (LIPITOR) 40 MG tablet 2024  Yes Yes   Sig: Take 2 tablets by mouth nightly   azelastine (ASTELIN) 0.1 % nasal spray 8/15/2024  Yes No   Si sprays daily as needed   losartan (COZAAR) 25 MG tablet 2024 at 7am  Yes Yes   Sig: Take 2 tablets by mouth daily   montelukast (SINGULAIR) 10 MG tablet 8/15/2024  Yes No   Sig: Take 1 tablet by mouth nightly   pantoprazole (PROTONIX) 40 MG tablet 2024  Yes Yes   Sig: Take 1 tablet by mouth daily   vitamin B-12 (CYANOCOBALAMIN) 1000 MCG tablet 8/15/2024  Yes No   Sig: Take 1 tablet by mouth daily   vitamin D (CHOLECALCIFEROL) 50 MCG (2000 UT) TABS tablet 8/15/2024  Yes No   Sig: Take 1 tablet by mouth daily      Facility-Administered Medications: None        Allergies:    Allergies   Allergen Reactions    Niacin Hives and Shortness Of Breath    Rosuvastatin Shortness Of Breath    Oxycodone-Acetaminophen Rash        Hospital Course:  The patient underwent surgery.  Complications:  None; patient tolerated the procedure well. Was taken to the PACU in stable condition and then transferred to the ortho floor.      Perioperative Antibiotics:  Ancef     Postoperative Pain Management:  Oxycodone & Tylenol     DVT Prophylaxis:   Aspirin 81mg BID     Postoperative transfusions:    Number of units banked PRBCs =   none     Post Op complications: none    Hemoglobin at discharge:    Lab Results   Component Value Date/Time    HGB 10.8 (L) 2024 05:13 AM    INR 1.0 2024 11:38 AM       Dressing remained clean, dry and intact. No significant erythema or swelling. Wound appears to be healing without any evidence of infection.  Neurovascular exam found to be within normal limits.     Physical Therapy started following surgery and participated in bed mobility, transfers and ambulation.          Discharged to: Home with .    Condition on Discharge:   Stable    Discharge instructions:  - Anticoagulate with Aspirin   - Take pain  medications as prescribed  - Resume pre hospital diet      - Discharge activity: activity as tolerated  - Ambulate with assistive device as needed.  - Weight bearing status - WBAT  - Wound Care Keep wound clean and dry.  See discharge instruction sheet.            -DISCHARGE MEDICATION LIST        Medication List        START taking these medications      acetaminophen 500 MG tablet  Commonly known as: TYLENOL  Take 1 tablet by mouth every 4 hours     aspirin 81 MG EC tablet  Take 1 tablet by mouth in the morning and at bedtime     oxyCODONE 5 MG immediate release tablet  Commonly known as: Roxicodone  Take 0.5-1 tablets by mouth every 4 hours as needed for Pain for up to 7 days. Take lowest dose possible to manage pain. Take half tab for mild to moderate pain level 1-6, or 1 tab for severe pain level 7-10 Max Daily Amount: 30 mg     sennosides-docusate sodium 8.6-50 MG tablet  Commonly known as: SENOKOT-S  Take 1 tablet by mouth in the morning and at bedtime            CONTINUE taking these medications      atorvastatin 40 MG tablet  Commonly known as: LIPITOR     azelastine 0.1 % nasal spray  Commonly known as: ASTELIN     Fish Oil 600 MG Caps     losartan 25 MG tablet  Commonly known as: COZAAR     montelukast 10 MG tablet  Commonly known as: SINGULAIR     Ozempic (0.25 or 0.5 MG/DOSE) 2 MG/3ML Sopn  Generic drug: Semaglutide(0.25 or 0.5MG/DOS)     pantoprazole 40 MG tablet  Commonly known as: PROTONIX     vitamin B-12 1000 MCG tablet  Commonly known as: CYANOCOBALAMIN     vitamin D 50 MCG (2000 UT) Tabs tablet  Commonly known as: CHOLECALCIFEROL               Where to Get Your Medications        These medications were sent to [facility] - [place], [place] - [address] [place] [address] - P [phone] - F [phone]  [address] [place] [address]      Phone: [phone]   acetaminophen 500 MG tablet  aspirin 81 MG EC tablet  oxyCODONE 5 MG immediate release tablet  sennosides-docusate sodium 8.6-50 MG

## 2024-08-23 NOTE — PLAN OF CARE
Problem: Physical Therapy - Adult  Goal: By Discharge: Performs mobility at highest level of function for planned discharge setting.  See evaluation for individualized goals.  Description: FUNCTIONAL STATUS PRIOR TO ADMISSION: Patient was independent and active without use of DME.    HOME SUPPORT PRIOR TO ADMISSION: The patient lived with wife but did not require assistance.    Physical Therapy Goals  Initiated 8/22/2024  1.  Patient will move from supine to sit and sit to supine, scoot up and down, and roll side to side in bed with independence within 4 day(s).    2.  Patient will perform sit to stand with modified independence within 4 day(s).  3.  Patient will transfer from bed to chair and chair to bed with modified independence using the least restrictive device within 4 day(s).  4.  Patient will ambulate with supervision/set-up for 200 feet with the least restrictive device within 4 day(s).   5.  Patient will ascend/descend 4 stairs with no handrail(s) with contact guard assist and appropriate assistive device within 4 day(s).  6. Patient will perform tka home exercise program per protocol with modified independence within 4 days.  7. Patient will demonstrate AROM 0-90 degrees in operative joint within 4 days.     8/23/2024 1231 by Rideg Jamil, RN  Outcome: Adequate for Discharge    PHYSICAL THERAPY TREATMENT    Patient: Sahil Carcamo (73 y.o. male)  Date: 8/23/2024  Diagnosis: Osteoarthritis of right knee [M17.11]  Primary osteoarthritis of right knee [M17.11] Osteoarthritis of right knee  Procedure(s) (LRB):  RIGHT TOTAL KNEE ARTHROPLASTY (SPINAL WITH BLOCK) (Right) 1 Day Post-Op  Precautions:                        ASSESSMENT:  Patient continues to benefit from skilled PT services and has met all goals. Patient demonstrated safe transfers, gait and stair climbing. Patient feels safe returning home with wife who can provide support. Patient demonstrated improved upright posture, more normalized gait

## 2024-08-23 NOTE — CARE COORDINATION
Care Management Initial Assessment       RUR: N/A  Readmission? No  1st IM letter given? No  1st  letter given: No    CM met with patient at bedside to introduce self and explain role. Patient states he is independent with ADLs prior to admission. Patient owns needed DME. Confirms family will transport him home at discharge. FOC offered for HH, Referrals pending.     [facility] accepted, CM added to AVS.        08/23/24 1507   Service Assessment   Patient Orientation Alert and Oriented;Person;Place;Situation;Self   Cognition Alert   History Provided By Patient   Primary Caregiver Self   Support Systems Spouse/Significant Other   Patient's Healthcare Decision Maker is: Legal Next of Kin   PCP Verified by CM Yes   Last Visit to PCP Within last 3 months   Prior Functional Level Independent in ADLs/IADLs   Can patient return to prior living arrangement Yes   Ability to make needs known: Good   Family able to assist with home care needs: Yes   Financial Resources Medicare   Social/Functional History   Lives With Spouse   Type of Home House   Home Layout Two level   Home Access Stairs to enter with rails   Entrance Stairs - Number of Steps 4   Home Equipment Walker - Rolling   Active  Yes   Discharge Planning   Type of Residence House   Living Arrangements Spouse/Significant Other   DME Ordered? No   Services At/After Discharge   Services At/After Discharge Home Health   Condition of Participation: Discharge Planning   The Plan for Transition of Care is related to the following treatment goals: home health   The Patient and/or Patient Representative was provided with a Choice of Provider? Patient   The Patient and/Or Patient Representative agree with the Discharge Plan? Yes   Freedom of Choice list was provided with basic dialogue that supports the patient's individualized plan of care/goals, treatment preferences, and shares the quality data associated with the providers?  Yes     Cecilia Carcamo  RN/CRM

## 2024-08-23 NOTE — PLAN OF CARE
Problem: Pain  Goal: Verbalizes/displays adequate comfort level or baseline comfort level  8/23/2024 0046 by sE Guerrero RN  Outcome: Not Progressing  8/22/2024 1507 by Chika Canales RN  Outcome: Progressing     Problem: Safety - Adult  Goal: Free from fall injury  8/23/2024 0046 by Es Guerrero RN  Outcome: Not Progressing  8/22/2024 1507 by Chika Canales RN  Outcome: Progressing     Problem: Discharge Planning  Goal: Discharge to home or other facility with appropriate resources  8/23/2024 0046 by Es Guerrero RN  Outcome: Not Progressing  8/22/2024 1507 by Chika Canales RN  Outcome: Progressing     Problem: ABCDS Injury Assessment  Goal: Absence of physical injury  Outcome: Not Progressing     Problem: Pain  Goal: Verbalizes/displays adequate comfort level or baseline comfort level  8/23/2024 0046 by Es Guerrero RN  Outcome: Not Progressing  8/22/2024 1507 by Chika Canales RN  Outcome: Progressing     Problem: Safety - Adult  Goal: Free from fall injury  8/23/2024 0046 by Es Guerrero RN  Outcome: Not Progressing  8/22/2024 1507 by Chika Canales RN  Outcome: Progressing     Problem: Discharge Planning  Goal: Discharge to home or other facility with appropriate resources  8/23/2024 0046 by Es Guerrero RN  Outcome: Not Progressing  8/22/2024 1507 by Chika Canales RN  Outcome: Progressing     Problem: ABCDS Injury Assessment  Goal: Absence of physical injury  Outcome: Not Progressing

## 2024-08-23 NOTE — OP NOTE
[address]  [address]                            OPERATIVE REPORT      PATIENT NAME: STACEY GONZALES           : 1950  MED REC NO: [id number]                       ROOM: 551  ACCOUNT NO: [id number]                       ADMIT DATE: 2024  PROVIDER: Chan Posey MD    DATE OF SERVICE:  2024    PREOPERATIVE DIAGNOSES:  Osteoarthritis, right knee.    POSTOPERATIVE DIAGNOSES:  Osteoarthritis, right knee.    PROCEDURES PERFORMED:  Right total knee arthroplasty.    SURGEON:  Chan Posey MD    ASSISTANT:  First assistant:  Shaunna Arango PA-C.    ANESTHESIA:  Spinal with sedation as well as adductor canal block.    ESTIMATED BLOOD LOSS:  200 mL.    SPECIMENS REMOVED:  None.       COMPLICATIONS:  None.    IMPLANTS:  [employer] 3D size 9 femur, size 10 tibial tray with a 14 mm polyethylene insert and 38 mm patella.    INDICATIONS:  The patient is a 73-year-old gentleman with progressive right knee pain due to severe osteoarthritis.  Symptoms have progressed despite comprehensive conservative treatment and he presents for right total knee replacement.  Risks, benefits, alternatives of procedure were reviewed with him in detail and he desires to proceed.    DESCRIPTION OF PROCEDURE:  Anesthesia team placed an adductor canal block in the right thigh before taking the patient to the operating room.  They also placed a spinal.  Preoperative IV antibiotics were administered.  A padded pneumatic tourniquet was placed around the right upper thigh.  Right lower extremity was prepped and draped in usual sterile fashion.  Tourniquet was inflated to 275.  Through midline anterior knee incision I performed a medial parapatellar arthrotomy.  Progressive medial release was performed to facilitate exposure and soft tissue balance throughout the procedure.  As soon as the knee was flexed, the tourniquet was released, 10 mm distal femoral  resection was performed using OrthAlign to navigate a neutral cut relative to mechanical axis of the femur.  PCL was excised.  Proximal tibial resection was performed using an extramedullary alignment guide with goal of 1-2 degrees of constitutional varus.  Menisci were excised.  Additional medial release was performed until there was a symmetrical extension gap with a 14 mm spacer block.  Knee was placed in 90 degrees of flexion and gap  was inserted.  Soft tissue tension was applied and blocks adjusted to produce 14 mm flexion space.  Femoral rotation was drilled and the femur was sized and prepared for a size 9 component utilizing appropriate jig.  Remaining posterior osteophytes removed from the femur and extensive subperiosteal posterior capsular release was performed.  Trials were inserted with 14 mm insert and placed the knee in full extension to gravity.  There was satisfactory coronal plane stability and soft tissue tension throughout arc of motion.  Patella was prepared for 38 mm component and tracked centrally using no thumbs technique.  Tourniquet was reinflated.  Trials were removed and tibial preparation was completed.  Bony surfaces were copiously irrigated by pulse lavage and dried before the real components were cemented into place using antibiotic impregnated cement.  Excess cement was removed.  The knee was reduced in full extension with the real insert in place during cement setup.  Periarticular soft tissues were injected with a solution containing 0.5% ropivacaine with epinephrine as well as clonidine and Toradol.  Tourniquet was released.  Wound was irrigated.  Arthrotomy was closed with a combination of heavy Vicryl sutures and a running #2 Stratafix suture.  Skin and subcutaneous layers were closed in a layered fashion with Vicryl and a running Monocryl subcuticular stitch.  Wound was dressed with Dermabond and silver-impregnated occlusive dressing as well as sterile compressive

## 2024-08-23 NOTE — FLOWSHEET NOTE
08/23/24 0810   Handoff   Communication Given Shift Handoff   Handoff Given To Xenia PALOMARES   Handoff Received From Es PALOMARES   Handoff Communication Face to Face;At bedside   Time Handoff Given 0810   End of Shift Check Performed Yes

## 2024-08-23 NOTE — ANESTHESIA POSTPROCEDURE EVALUATION
Department of Anesthesiology  Postprocedure Note    Patient: Sahil Carcamo  MRN: [id number]  YOB: 1950  Date of evaluation: 8/23/2024    Procedure Summary       Date: 08/22/24 Room / Location: [facility] MAIN OR [address] [place] [address] MAIN OR    Anesthesia Start: 1025 Anesthesia Stop: 1243    Procedure: RIGHT TOTAL KNEE ARTHROPLASTY (SPINAL WITH BLOCK) (Right: Knee) Diagnosis:       Osteoarthritis of right knee      (Osteoarthritis of right knee [M17.11])    Providers: Chan Posey MD Responsible Provider: Angela Dunn DO    Anesthesia Type: Spinal ASA Status: 3            Anesthesia Type: Spinal    Patti Phase I: Patti Score: 10    Patti Phase II:      Anesthesia Post Evaluation    Patient location during evaluation: PACU  Level of consciousness: awake  Airway patency: patent  Nausea & Vomiting: no nausea  Cardiovascular status: hemodynamically stable  Respiratory status: acceptable  Hydration status: stable  Multimodal analgesia pain management approach  Pain management: adequate    No notable events documented.

## 2024-08-23 NOTE — PROGRESS NOTES
Ortho NP Note    POD# 1  s/p RIGHT TOTAL KNEE ARTHROPLASTY (SPINAL WITH BLOCK)   Pt seen WERNER Maza. No visitor.     Pt resting in bed comfortably. Very pleasant.   Reports postop knee pain 4/10 , aware of prn oxycodone   Able to mobilize well with PT yesterday.   Tolerating regular diet. No nausea.     Concerned about home environment, most concerned about stairs, bedroom on 2nd floor but has option to stay on first floor with bathroom on first level as well. Discussed options of SNF, IPR likely not an option, he would prefer to go home over d/c to SNF. Plan to practice stairs today.     VSS Afebrile.    Visit Vitals  /61   Pulse 62   Temp 97.3 °F (36.3 °C) (Oral)   Resp 16   Ht 1.803 m (5' 10.98\")   Wt 125.5 kg (276 lb 10.8 oz)   SpO2 94%   BMI 38.61 kg/m²       Voiding status: +voiding         Labs    Lab Results   Component Value Date/Time    HGB 10.8 08/23/2024 05:13 AM      Lab Results   Component Value Date/Time    INR 1.0 08/08/2024 11:38 AM      Lab Results   Component Value Date/Time     08/23/2024 05:13 AM    K 4.3 08/23/2024 05:13 AM     08/23/2024 05:13 AM    CO2 27 08/23/2024 05:13 AM    BUN 22 08/23/2024 05:13 AM     Recent Glucose Results:   Glucose   Date Value Ref Range Status   08/23/2024 120 (H) 65 - 100 mg/dL Final   09/10/2021 169 (H) 65 - 100 mg/dL Final   06/26/2019 98 65 - 99 mg/dL Final           Body mass index is 38.61 kg/m². : A BMI > 30 is classified as obesity and > 40 is classified as morbid obesity.       Ace wrap dressing c.d.i  Cryotherapy in place over incision  Calves soft and supple; No pain with passive stretch  Sensation and motor intact. +PF/DF/EHL intact   SCDs for mechanical DVT proph while in bed     PLAN:  1) PT BID - WBAT  2) Aspirin 81 mg PO BID for DVT Prophylaxis. Encouraged early mobilization, bed exercises, and SCD use.  3) Pain control - scheduled tylenol  and toradol, and prn  oxycodone  .  4) Post op care - Continue bowel regimen, encouraged

## 2024-08-26 ENCOUNTER — APPOINTMENT (OUTPATIENT)
Facility: HOSPITAL | Age: 74
End: 2024-08-26
Payer: MEDICARE

## 2024-08-26 ENCOUNTER — HOSPITAL ENCOUNTER (EMERGENCY)
Facility: HOSPITAL | Age: 74
Discharge: HOME | End: 2024-08-26
Attending: EMERGENCY MEDICINE
Payer: MEDICARE

## 2024-08-26 VITALS
SYSTOLIC BLOOD PRESSURE: 167 MMHG | RESPIRATION RATE: 23 BRPM | HEIGHT: 71 IN | DIASTOLIC BLOOD PRESSURE: 86 MMHG | OXYGEN SATURATION: 94 % | WEIGHT: 275 LBS | HEART RATE: 88 BPM | TEMPERATURE: 98.8 F | BODY MASS INDEX: 38.5 KG/M2

## 2024-08-26 DIAGNOSIS — L03.115 CELLULITIS OF RIGHT LOWER EXTREMITY: Primary | ICD-10-CM

## 2024-08-26 LAB
ALBUMIN SERPL-MCNC: 2.7 G/DL (ref 3.5–5)
ALBUMIN/GLOB SERPL: 0.7 (ref 1.1–2.2)
ALP SERPL-CCNC: 76 U/L (ref 45–117)
ALT SERPL-CCNC: 27 U/L (ref 12–78)
ANION GAP SERPL CALC-SCNC: 7 MMOL/L (ref 5–15)
AST SERPL-CCNC: 22 U/L (ref 15–37)
BASOPHILS # BLD: 0 K/UL (ref 0–0.1)
BASOPHILS NFR BLD: 0 % (ref 0–1)
BILIRUB SERPL-MCNC: 1.5 MG/DL (ref 0.2–1)
BUN SERPL-MCNC: 18 MG/DL (ref 6–20)
BUN/CREAT SERPL: 16 (ref 12–20)
CALCIUM SERPL-MCNC: 8.8 MG/DL (ref 8.5–10.1)
CHLORIDE SERPL-SCNC: 105 MMOL/L (ref 97–108)
CO2 SERPL-SCNC: 28 MMOL/L (ref 21–32)
CREAT SERPL-MCNC: 1.14 MG/DL (ref 0.7–1.3)
DIFFERENTIAL METHOD BLD: ABNORMAL
ECHO BSA: 2.49 M2
EOSINOPHIL # BLD: 0.2 K/UL (ref 0–0.4)
EOSINOPHIL NFR BLD: 3 % (ref 0–7)
ERYTHROCYTE [DISTWIDTH] IN BLOOD BY AUTOMATED COUNT: 14.6 % (ref 11.5–14.5)
GLOBULIN SER CALC-MCNC: 4 G/DL (ref 2–4)
GLUCOSE SERPL-MCNC: 103 MG/DL (ref 65–100)
HCT VFR BLD AUTO: 31.2 % (ref 36.6–50.3)
HGB BLD-MCNC: 10.3 G/DL (ref 12.1–17)
IMM GRANULOCYTES # BLD AUTO: 0.1 K/UL (ref 0–0.04)
IMM GRANULOCYTES NFR BLD AUTO: 1 % (ref 0–0.5)
LYMPHOCYTES # BLD: 0.9 K/UL (ref 0.8–3.5)
LYMPHOCYTES NFR BLD: 10 % (ref 12–49)
MCH RBC QN AUTO: 29.5 PG (ref 26–34)
MCHC RBC AUTO-ENTMCNC: 33 G/DL (ref 30–36.5)
MCV RBC AUTO: 89.4 FL (ref 80–99)
MONOCYTES # BLD: 0.9 K/UL (ref 0–1)
MONOCYTES NFR BLD: 10 % (ref 5–13)
NEUTS SEG # BLD: 7 K/UL (ref 1.8–8)
NEUTS SEG NFR BLD: 76 % (ref 32–75)
NRBC # BLD: 0 K/UL (ref 0–0.01)
NRBC BLD-RTO: 0 PER 100 WBC
PLATELET # BLD AUTO: 158 K/UL (ref 150–400)
PMV BLD AUTO: 12.2 FL (ref 8.9–12.9)
POTASSIUM SERPL-SCNC: 3.7 MMOL/L (ref 3.5–5.1)
PROT SERPL-MCNC: 6.7 G/DL (ref 6.4–8.2)
RBC # BLD AUTO: 3.49 M/UL (ref 4.1–5.7)
SODIUM SERPL-SCNC: 140 MMOL/L (ref 136–145)
WBC # BLD AUTO: 9.1 K/UL (ref 4.1–11.1)

## 2024-08-26 PROCEDURE — 80053 COMPREHEN METABOLIC PANEL: CPT

## 2024-08-26 PROCEDURE — 96365 THER/PROPH/DIAG IV INF INIT: CPT

## 2024-08-26 PROCEDURE — 93971 EXTREMITY STUDY: CPT

## 2024-08-26 PROCEDURE — 36415 COLL VENOUS BLD VENIPUNCTURE: CPT

## 2024-08-26 PROCEDURE — 85025 COMPLETE CBC W/AUTO DIFF WBC: CPT

## 2024-08-26 PROCEDURE — 99284 EMERGENCY DEPT VISIT MOD MDM: CPT

## 2024-08-26 PROCEDURE — 2580000003 HC RX 258: Performed by: EMERGENCY MEDICINE

## 2024-08-26 PROCEDURE — 6360000002 HC RX W HCPCS: Performed by: EMERGENCY MEDICINE

## 2024-08-26 RX ORDER — SULFAMETHOXAZOLE/TRIMETHOPRIM 800-160 MG
1 TABLET ORAL 2 TIMES DAILY
Qty: 20 TABLET | Refills: 0 | Status: SHIPPED | OUTPATIENT
Start: 2024-08-26 | End: 2024-09-05

## 2024-08-26 RX ORDER — CEPHALEXIN 500 MG/1
500 CAPSULE ORAL 2 TIMES DAILY
Qty: 20 CAPSULE | Refills: 0 | Status: SHIPPED | OUTPATIENT
Start: 2024-08-26 | End: 2024-09-05

## 2024-08-26 RX ADMIN — SODIUM CHLORIDE 1000 MG: 900 INJECTION INTRAVENOUS at 18:14

## 2024-08-26 ASSESSMENT — LIFESTYLE VARIABLES
HOW OFTEN DO YOU HAVE A DRINK CONTAINING ALCOHOL: NEVER
HOW MANY STANDARD DRINKS CONTAINING ALCOHOL DO YOU HAVE ON A TYPICAL DAY: PATIENT DOES NOT DRINK

## 2024-08-26 ASSESSMENT — PAIN DESCRIPTION - LOCATION: LOCATION: KNEE

## 2024-08-26 ASSESSMENT — PAIN SCALES - GENERAL
PAINLEVEL_OUTOF10: 4
PAINLEVEL_OUTOF10: 2

## 2024-08-26 ASSESSMENT — PAIN DESCRIPTION - ORIENTATION: ORIENTATION: RIGHT

## 2024-08-26 ASSESSMENT — PAIN DESCRIPTION - DESCRIPTORS: DESCRIPTORS: ACHING

## 2024-08-26 NOTE — ED PROVIDER NOTES
[facility] EMERGENCY DEPT  EMERGENCY DEPARTMENT ENCOUNTER       Pt Name: Sahil Carcamo  MRN: [id number]  Birthdate 1950  Date of evaluation: 8/26/2024  Provider: Rian Perdomo MD   PCP: Billy Hill MD  Note Started: 6:49 PM 8/26/24     CHIEF COMPLAINT       Chief Complaint   Patient presents with    Post-op Problem     Arrives to triage in wheelchair c/o R foot swelling, constipation, nausea, and chills since his R knee replacement last Thursday with Dr. Posey. Has not had a BM since 21st.        HISTORY OF PRESENT ILLNESS: 1 or more elements      History From: Patient, History limited by: None     Sahil Carcamo is a 73 y.o. male history of CAD, diabetes, status post total knee replacement on the right, postop day 4 who presents with right leg swelling and pain.  He reports progressive swelling to the entire right leg since his surgery, intermittent pain to the same extremity. He reports chills which are intermittent, nausea which is intermittent.     Nursing Notes were all reviewed and agreed with or any disagreements were addressed in the HPI.     REVIEW OF SYSTEMS        Positives and Pertinent negatives as per HPI.    PAST HISTORY     Past Medical History:  Past Medical History:   Diagnosis Date    Arthritis 2008    CAD (coronary artery disease) 2014    h/o MI with stent    Cancer (HCC) 2007    prostate    Diabetes mellitus (HCC)     Dyspepsia and other specified disorders of function of stomach     GERD (gastroesophageal reflux disease) 2005    Hyperlipidemia     Hypertension     Joint pain     Musculoskeletal disorder     Prolonged emergence from general anesthesia     Sleep apnea     not using CPAP at this time    SOB (shortness of breath)        Past Surgical History:  Past Surgical History:   Procedure Laterality Date    ABDOMEN SURGERY  2007    REMOVE NON CANCEROUS MASS FROM COLON    CHOLECYSTECTOMY  2019    GI  2007    remove mass of colon    KNEE ARTHROSCOPY Right 2016    PROSTATECTOMY  2007

## 2024-08-26 NOTE — DISCHARGE INSTRUCTIONS
Thank You!    It was a pleasure taking care of you in our Emergency Department today. We know that when you come to our Emergency Department, you are entrusting us with your health, comfort, and safety. Our physicians and nurses honor that trust, and truly appreciate the opportunity to care for you and your loved ones.      We also value your feedback. If you receive a survey about your Emergency Department experience today, please fill it out.  We care about our patients' feedback, and we listen to what you have to say.  Thank you.    Rian Perdomo MD  ________________________________________________________________________  I have included a copy of your lab results and/or radiologic studies from today's visit so you can have them easily available at your follow-up visit. We hope you feel better and please do not hesitate to contact the ED if you have any questions at all!    Recent Results (from the past 12 hour(s))   CBC with Auto Differential    Collection Time: 08/26/24  3:57 PM   Result Value Ref Range    WBC 9.1 4.1 - 11.1 K/uL    RBC 3.49 (L) 4.10 - 5.70 M/uL    Hemoglobin 10.3 (L) 12.1 - 17.0 g/dL    Hematocrit 31.2 (L) 36.6 - 50.3 %    MCV 89.4 80.0 - 99.0 FL    MCH 29.5 26.0 - 34.0 PG    MCHC 33.0 30.0 - 36.5 g/dL    RDW 14.6 (H) 11.5 - 14.5 %    Platelets 158 150 - 400 K/uL    MPV 12.2 8.9 - 12.9 FL    Nucleated RBCs 0.0 0  WBC    nRBC 0.00 0.00 - 0.01 K/uL    Neutrophils % 76 (H) 32 - 75 %    Lymphocytes % 10 (L) 12 - 49 %    Monocytes % 10 5 - 13 %    Eosinophils % 3 0 - 7 %    Basophils % 0 0 - 1 %    Immature Granulocytes % 1 (H) 0.0 - 0.5 %    Neutrophils Absolute 7.0 1.8 - 8.0 K/UL    Lymphocytes Absolute 0.9 0.8 - 3.5 K/UL    Monocytes Absolute 0.9 0.0 - 1.0 K/UL    Eosinophils Absolute 0.2 0.0 - 0.4 K/UL    Basophils Absolute 0.0 0.0 - 0.1 K/UL    Immature Granulocytes Absolute 0.1 (H) 0.00 - 0.04 K/UL    Differential Type AUTOMATED     Comprehensive Metabolic Panel    Collection Time:

## 2025-03-26 ENCOUNTER — HOSPITAL ENCOUNTER (OUTPATIENT)
Facility: HOSPITAL | Age: 75
Discharge: HOME | End: 2025-03-29
Attending: SURGERY
Payer: MEDICARE

## 2025-03-26 DIAGNOSIS — I71.9 HYALINE NECROSIS OF AORTA: ICD-10-CM

## 2025-03-26 LAB — CREAT BLD-MCNC: 1.1 MG/DL (ref 0.6–1.3)

## 2025-03-26 PROCEDURE — 6360000004 HC RX CONTRAST MEDICATION: Performed by: SURGERY

## 2025-03-26 PROCEDURE — 74174 CTA ABD&PLVS W/CONTRAST: CPT

## 2025-03-26 PROCEDURE — 82565 ASSAY OF CREATININE: CPT

## 2025-03-26 RX ORDER — IOPAMIDOL 755 MG/ML
100 INJECTION, SOLUTION INTRAVASCULAR
Status: COMPLETED | OUTPATIENT
Start: 2025-03-26 | End: 2025-03-26

## 2025-03-26 RX ADMIN — IOPAMIDOL 100 ML: 755 INJECTION, SOLUTION INTRAVENOUS at 15:17

## (undated) DEVICE — SOLUTION IRRIG 3000ML 0.9% SOD CHL USP UROMATIC PLAS CONT

## (undated) DEVICE — TIP SUCT CRV REG REDI

## (undated) DEVICE — GAUZE SPONGES,12 PLY: Brand: CURITY

## (undated) DEVICE — SUTURE VICRYL SZ 0 L27IN ABSRB UD L36MM CT-1 1/2 CIR J260H

## (undated) DEVICE — GLOVE SURG SZ 65 L12IN FNGR THK79MIL GRN LTX FREE

## (undated) DEVICE — SUT ETHLN 3-0 18IN PS1 BLK --

## (undated) DEVICE — SOLUTION LACTATED RINGERS INJECTION USP

## (undated) DEVICE — DRESSING HYDROFIBER AQUACEL AG ADVANTAGE 3.5X14 IN

## (undated) DEVICE — SUTURE VICRYL ABSORBABLE BRAIDED 2-0 CT 36 IN DA UD  VCP957H

## (undated) DEVICE — KENDALL DL ECG CABLE AND LEAD WIRE SYSTEM, 3-LEAD, SINGLE PATIENT USE: Brand: KENDALL

## (undated) DEVICE — ZIPPERED TOGA, 2X LARGE: Brand: FLYTE, SURGICOOL

## (undated) DEVICE — LIQUIBAND RAPID ADHESIVE 36/CS 0.8ML: Brand: MEDLINE

## (undated) DEVICE — SOLUTION SURG PREP 26 CC PURPREP

## (undated) DEVICE — STRYKER PERFORMANCE SERIES SAGITTAL BLADE: Brand: STRYKER PERFORMANCE SERIES

## (undated) DEVICE — INFECTION CONTROL KIT SYS

## (undated) DEVICE — ZIPPERED TOGA, X-LARGE: Brand: FLYTE, SURGICOOL

## (undated) DEVICE — SHEET,DRAPE,53X77,STERILE: Brand: MEDLINE

## (undated) DEVICE — SUTURE MONOCRYL + SZ 3-0 L27IN ABSRB UD PS1 L24MM 3/8 CIR REV MCP936H

## (undated) DEVICE — SCRUBIN SCRUB BRUSH DRY STER: Brand: MEDLINE INDUSTRIES, INC.

## (undated) DEVICE — ARTHROSCOPIC KNEE HOLDER: Brand: DEVON

## (undated) DEVICE — ZIMMER® STERILE DISPOSABLE TOURNIQUET CUFF WITH PLC, DUAL PORT, SINGLE BLADDER, 34 IN. (86 CM)

## (undated) DEVICE — CUSTOM CAST PD STR

## (undated) DEVICE — PADDING CAST W6INXL4YD NONSTERILE COT RAYON MICROPLEATED

## (undated) DEVICE — 10K/24K ARTHROSCOPY INFLOW TUBE SET: Brand: 10K/24K

## (undated) DEVICE — SUTURE ABSORBABLE MONOFILAMENT 1 CTX 36 CM 48 MM VIO PDS +

## (undated) DEVICE — STERILE POLYISOPRENE POWDER-FREE SURGICAL GLOVES: Brand: PROTEXIS

## (undated) DEVICE — SPONGE GZ W4XL4IN COT 12 PLY TYP VII WVN C FLD DSGN STERILE

## (undated) DEVICE — MARKER,SKIN,WI/RULER AND LABELS: Brand: MEDLINE

## (undated) DEVICE — 3M™ TEGADERM™ TRANSPARENT FILM DRESSING FRAME STYLE, 1624W, 2-3/8 IN X 2-3/4 IN (6 CM X 7 CM), 100/CT 4CT/CASE: Brand: 3M™ TEGADERM™

## (undated) DEVICE — HANDPIECE SET WITH BONE CLEANING TIP AND SUCTION TUBE: Brand: INTERPULSE

## (undated) DEVICE — (D)PREP SKN CHLRAPRP APPL 26ML -- CONVERT TO ITEM 371833

## (undated) DEVICE — 4-PORT MANIFOLD: Brand: NEPTUNE 2

## (undated) DEVICE — CONTAINER,SPECIMEN,4OZ,OR STRL: Brand: MEDLINE

## (undated) DEVICE — ABDOMINAL PAD: Brand: DERMACEA

## (undated) DEVICE — SYRINGE MED 10ML LUERLOCK TIP W/O SFTY DISP

## (undated) DEVICE — 4.5 MM INCISOR STRAIGHT BLADES,                                    POWER/EP-1, LIME GREEN, PACKAGED 6                                    PER BOX, STERILE

## (undated) DEVICE — SYRINGE 20ML LL S/C 50

## (undated) DEVICE — GLOVE SURG SZ 65 L12IN FNGR THK94MIL STD WHT LTX FREE

## (undated) DEVICE — SUTURE VICRYL 1 L27IN ABSRB CT BRAID COAT UD J281H

## (undated) DEVICE — ELECTRODE PT RET AD L9FT HI MOIST COND ADH HYDRGEL CORDED

## (undated) DEVICE — YANKAUER,FLEXIBLE HANDLE,REGLR CAPACITY: Brand: MEDLINE INDUSTRIES, INC.

## (undated) DEVICE — NEEDLE HYPO 21GA L1IN GRN S STL HUB POLYPR SHLD REG BVL

## (undated) DEVICE — KNEE ARTHROSCOPY IV-LF: Brand: MEDLINE INDUSTRIES, INC.

## (undated) DEVICE — CONTINU-FLO SOLUTION SET, 2 INJECTION SITES, MALE LUER LOCK ADAPTER WITH RETRACTABLE COLLAR, LARGE BORE STOPCOCK WITH ROTATING MALE LUER LOCK EXTENSION SET, 2 INJECTION SITES, MALE LUER LOCK ADAPTER WITH RETRACTABLE COLLAR: Brand: INTERLINK/CONTINU-FLO

## (undated) DEVICE — TOTAL JOINT - SMH: Brand: MEDLINE INDUSTRIES, INC.

## (undated) DEVICE — APPLICATOR MEDICATED 26 CC SOLUTION HI LT ORNG CHLORAPREP

## (undated) DEVICE — STERILE POLYISOPRENE POWDER-FREE SURGICAL GLOVES WITH EMOLLIENT COATING: Brand: PROTEXIS

## (undated) DEVICE — SOLUTION IRRIG 3000ML LAC R FLX CONT

## (undated) DEVICE — INTENT OT USE PROVIDES A STERILE INTERFACE BETWEEN THE OPERATING ROOM SURGICAL LAMPS (NON-STERILE) AND THE SURGEON OR STAFF WORKING IN THE STERILE FIELD.: Brand: ASPEN® ALC PLUS LIGHT HANDLE COVER

## (undated) DEVICE — DRAPE,EXTREMITY,89X128,STERILE: Brand: MEDLINE